# Patient Record
Sex: FEMALE | Race: WHITE | NOT HISPANIC OR LATINO | Employment: FULL TIME | URBAN - METROPOLITAN AREA
[De-identification: names, ages, dates, MRNs, and addresses within clinical notes are randomized per-mention and may not be internally consistent; named-entity substitution may affect disease eponyms.]

---

## 2019-01-23 ENCOUNTER — NON-PROVIDER VISIT (OUTPATIENT)
Dept: URGENT CARE | Facility: CLINIC | Age: 24
End: 2019-01-23

## 2019-01-23 ENCOUNTER — HOSPITAL ENCOUNTER (OUTPATIENT)
Facility: MEDICAL CENTER | Age: 24
End: 2019-01-23
Attending: PHYSICIAN ASSISTANT
Payer: COMMERCIAL

## 2019-01-23 DIAGNOSIS — Z77.011 LEAD EXPOSURE: ICD-10-CM

## 2019-01-23 PROCEDURE — 84202 ASSAY RBC PROTOPORPHYRIN: CPT | Performed by: PHYSICIAN ASSISTANT

## 2019-01-23 PROCEDURE — 83655 ASSAY OF LEAD: CPT | Performed by: PHYSICIAN ASSISTANT

## 2019-01-28 LAB
LEAD BLD-MCNC: <2 UG/DL (ref 0–4.9)
ZPP RBC-MCNC: 23 UG/DL (ref 0–40)
ZPP RBC-SRTO: 39 UMOLZPP/MOLHEM (ref 0–69)

## 2019-02-01 ENCOUNTER — HOSPITAL ENCOUNTER (OUTPATIENT)
Facility: MEDICAL CENTER | Age: 24
End: 2019-02-01
Attending: FAMILY MEDICINE
Payer: COMMERCIAL

## 2019-02-01 ENCOUNTER — OFFICE VISIT (OUTPATIENT)
Dept: URGENT CARE | Facility: CLINIC | Age: 24
End: 2019-02-01

## 2019-02-01 VITALS
HEIGHT: 66 IN | OXYGEN SATURATION: 98 % | RESPIRATION RATE: 14 BRPM | BODY MASS INDEX: 23.46 KG/M2 | WEIGHT: 146 LBS | TEMPERATURE: 98.9 F | HEART RATE: 72 BPM | SYSTOLIC BLOOD PRESSURE: 116 MMHG | DIASTOLIC BLOOD PRESSURE: 72 MMHG

## 2019-02-01 DIAGNOSIS — Z02.89 ENCOUNTER FOR OCCUPATIONAL HEALTH ASSESSMENT: ICD-10-CM

## 2019-02-01 DIAGNOSIS — Z01.89 RESPIRATORY CLEARANCE EXAMINATION, ENCOUNTER FOR: ICD-10-CM

## 2019-02-01 LAB
ALBUMIN SERPL BCP-MCNC: 4.9 G/DL (ref 3.2–4.9)
ALBUMIN/GLOB SERPL: 2.1 G/DL
ALP SERPL-CCNC: 46 U/L (ref 30–99)
ALT SERPL-CCNC: 17 U/L (ref 2–50)
AMORPH CRY #/AREA URNS HPF: PRESENT /HPF
ANION GAP SERPL CALC-SCNC: 12 MMOL/L (ref 0–11.9)
APPEARANCE UR: CLEAR
AST SERPL-CCNC: 19 U/L (ref 12–45)
BACTERIA #/AREA URNS HPF: ABNORMAL /HPF
BASOPHILS # BLD AUTO: 0.5 % (ref 0–1.8)
BASOPHILS # BLD: 0.05 K/UL (ref 0–0.12)
BILIRUB SERPL-MCNC: 0.6 MG/DL (ref 0.1–1.5)
BILIRUB UR QL STRIP.AUTO: NEGATIVE
BUN SERPL-MCNC: 12 MG/DL (ref 8–22)
CALCIUM SERPL-MCNC: 10.4 MG/DL (ref 8.5–10.5)
CHLORIDE SERPL-SCNC: 106 MMOL/L (ref 96–112)
CO2 SERPL-SCNC: 22 MMOL/L (ref 20–33)
COLOR UR: YELLOW
CREAT SERPL-MCNC: 0.85 MG/DL (ref 0.5–1.4)
EOSINOPHIL # BLD AUTO: 0.09 K/UL (ref 0–0.51)
EOSINOPHIL NFR BLD: 0.9 % (ref 0–6.9)
EPI CELLS #/AREA URNS HPF: ABNORMAL /HPF
ERYTHROCYTE [DISTWIDTH] IN BLOOD BY AUTOMATED COUNT: 43.8 FL (ref 35.9–50)
GLOBULIN SER CALC-MCNC: 2.3 G/DL (ref 1.9–3.5)
GLUCOSE SERPL-MCNC: 96 MG/DL (ref 65–99)
GLUCOSE UR STRIP.AUTO-MCNC: NEGATIVE MG/DL
HCT VFR BLD AUTO: 46.4 % (ref 37–47)
HGB BLD-MCNC: 15.3 G/DL (ref 12–16)
IMM GRANULOCYTES # BLD AUTO: 0.02 K/UL (ref 0–0.11)
IMM GRANULOCYTES NFR BLD AUTO: 0.2 % (ref 0–0.9)
KETONES UR STRIP.AUTO-MCNC: NEGATIVE MG/DL
LEUKOCYTE ESTERASE UR QL STRIP.AUTO: NEGATIVE
LYMPHOCYTES # BLD AUTO: 2.94 K/UL (ref 1–4.8)
LYMPHOCYTES NFR BLD: 27.8 % (ref 22–41)
MCH RBC QN AUTO: 30.4 PG (ref 27–33)
MCHC RBC AUTO-ENTMCNC: 33 G/DL (ref 33.6–35)
MCV RBC AUTO: 92.1 FL (ref 81.4–97.8)
MONOCYTES # BLD AUTO: 0.73 K/UL (ref 0–0.85)
MONOCYTES NFR BLD AUTO: 6.9 % (ref 0–13.4)
NEUTROPHILS # BLD AUTO: 6.75 K/UL (ref 2–7.15)
NEUTROPHILS NFR BLD: 63.7 % (ref 44–72)
NITRITE UR QL STRIP.AUTO: NEGATIVE
NRBC # BLD AUTO: 0 K/UL
NRBC BLD-RTO: 0 /100 WBC
PH UR STRIP.AUTO: 6 [PH]
PLATELET # BLD AUTO: 338 K/UL (ref 164–446)
PMV BLD AUTO: 10.4 FL (ref 9–12.9)
POTASSIUM SERPL-SCNC: 4.7 MMOL/L (ref 3.6–5.5)
PROT SERPL-MCNC: 7.2 G/DL (ref 6–8.2)
PROT UR QL STRIP: 30 MG/DL
RBC # BLD AUTO: 5.04 M/UL (ref 4.2–5.4)
RBC UR QL AUTO: NEGATIVE
SODIUM SERPL-SCNC: 140 MMOL/L (ref 135–145)
SP GR UR STRIP.AUTO: 1.02
UROBILINOGEN UR STRIP.AUTO-MCNC: 0.2 MG/DL
WBC # BLD AUTO: 10.6 K/UL (ref 4.8–10.8)

## 2019-02-01 PROCEDURE — 8915 PR COMPREHENSIVE PHYSICAL: Performed by: FAMILY MEDICINE

## 2019-02-01 PROCEDURE — 94010 BREATHING CAPACITY TEST: CPT | Performed by: FAMILY MEDICINE

## 2019-02-01 NOTE — PROGRESS NOTES
"Here for pre-employment physical, spirometry         spirometry - within normal limits.    Spirometry showed no sx of restrictive or obstructive lung disease      Physical exam - unremarkable.       Plan:      Cleared for duty without restriction or accomodation.      See physical exam form scanned under \"media\"    "

## 2019-02-20 ENCOUNTER — HOSPITAL ENCOUNTER (OUTPATIENT)
Facility: MEDICAL CENTER | Age: 24
End: 2019-02-20
Attending: NURSE PRACTITIONER
Payer: COMMERCIAL

## 2019-02-20 ENCOUNTER — NON-PROVIDER VISIT (OUTPATIENT)
Dept: URGENT CARE | Facility: CLINIC | Age: 24
End: 2019-02-20

## 2019-02-20 DIAGNOSIS — Z77.011 LEAD EXPOSURE: ICD-10-CM

## 2019-02-20 PROCEDURE — 83655 ASSAY OF LEAD: CPT | Performed by: NURSE PRACTITIONER

## 2019-02-20 PROCEDURE — 84202 ASSAY RBC PROTOPORPHYRIN: CPT | Performed by: NURSE PRACTITIONER

## 2019-02-25 LAB
LEAD BLD-MCNC: <2 UG/DL (ref 0–4.9)
ZPP RBC-MCNC: 23 UG/DL (ref 0–40)
ZPP RBC-SRTO: 40 UMOLZPP/MOLHEM (ref 0–69)

## 2019-03-20 ENCOUNTER — NON-PROVIDER VISIT (OUTPATIENT)
Dept: URGENT CARE | Facility: CLINIC | Age: 24
End: 2019-03-20

## 2019-03-20 ENCOUNTER — HOSPITAL ENCOUNTER (OUTPATIENT)
Facility: MEDICAL CENTER | Age: 24
End: 2019-03-20
Attending: PHYSICIAN ASSISTANT
Payer: COMMERCIAL

## 2019-03-20 DIAGNOSIS — Z77.011 LEAD EXPOSURE: ICD-10-CM

## 2019-03-20 PROCEDURE — 84202 ASSAY RBC PROTOPORPHYRIN: CPT | Performed by: PHYSICIAN ASSISTANT

## 2019-03-20 PROCEDURE — 83655 ASSAY OF LEAD: CPT | Performed by: PHYSICIAN ASSISTANT

## 2019-03-21 DIAGNOSIS — Z77.011 LEAD EXPOSURE: ICD-10-CM

## 2019-03-25 LAB
LEAD BLD-MCNC: <2 UG/DL (ref 0–4.9)
ZPP RBC-MCNC: 21 UG/DL (ref 0–40)
ZPP RBC-SRTO: 36 UMOLZPP/MOLHEM (ref 0–69)

## 2019-04-09 ENCOUNTER — HOSPITAL ENCOUNTER (OUTPATIENT)
Facility: MEDICAL CENTER | Age: 24
End: 2019-04-09
Attending: NURSE PRACTITIONER
Payer: COMMERCIAL

## 2019-04-09 ENCOUNTER — NON-PROVIDER VISIT (OUTPATIENT)
Dept: URGENT CARE | Facility: CLINIC | Age: 24
End: 2019-04-09

## 2019-04-09 DIAGNOSIS — Z77.011 LEAD EXPOSURE: ICD-10-CM

## 2019-04-09 PROCEDURE — 83655 ASSAY OF LEAD: CPT | Performed by: PHYSICIAN ASSISTANT

## 2019-04-09 PROCEDURE — 84202 ASSAY RBC PROTOPORPHYRIN: CPT | Performed by: PHYSICIAN ASSISTANT

## 2019-04-10 DIAGNOSIS — Z77.011 LEAD EXPOSURE: ICD-10-CM

## 2019-04-15 LAB
LEAD BLD-MCNC: <2 UG/DL (ref 0–4.9)
ZPP RBC-MCNC: 25 UG/DL (ref 0–40)
ZPP RBC-SRTO: 43 UMOLZPP/MOLHEM (ref 0–69)

## 2019-05-23 ENCOUNTER — NON-PROVIDER VISIT (OUTPATIENT)
Dept: URGENT CARE | Facility: CLINIC | Age: 24
End: 2019-05-23

## 2019-05-23 DIAGNOSIS — Z77.011 LEAD EXPOSURE: ICD-10-CM

## 2019-05-23 PROCEDURE — 84202 ASSAY RBC PROTOPORPHYRIN: CPT | Performed by: PHYSICIAN ASSISTANT

## 2019-05-23 PROCEDURE — 83655 ASSAY OF LEAD: CPT | Performed by: PHYSICIAN ASSISTANT

## 2019-05-24 ENCOUNTER — HOSPITAL ENCOUNTER (OUTPATIENT)
Facility: MEDICAL CENTER | Age: 24
End: 2019-05-24
Attending: PHYSICIAN ASSISTANT
Payer: COMMERCIAL

## 2019-05-25 DIAGNOSIS — Z77.011 LEAD EXPOSURE: ICD-10-CM

## 2019-05-29 LAB
LEAD BLD-MCNC: <2 UG/DL (ref 0–4.9)
ZPP RBC-MCNC: 25 UG/DL (ref 0–40)
ZPP RBC-SRTO: 42 UMOLZPP/MOLHEM (ref 0–69)

## 2019-06-20 ENCOUNTER — NON-PROVIDER VISIT (OUTPATIENT)
Dept: URGENT CARE | Facility: CLINIC | Age: 24
End: 2019-06-20

## 2019-06-20 DIAGNOSIS — Z77.011 LEAD EXPOSURE: ICD-10-CM

## 2019-06-20 PROCEDURE — 83655 ASSAY OF LEAD: CPT | Performed by: PHYSICIAN ASSISTANT

## 2019-06-20 PROCEDURE — 84202 ASSAY RBC PROTOPORPHYRIN: CPT | Performed by: PHYSICIAN ASSISTANT

## 2019-06-21 ENCOUNTER — HOSPITAL ENCOUNTER (OUTPATIENT)
Facility: MEDICAL CENTER | Age: 24
End: 2019-06-21
Attending: PHYSICIAN ASSISTANT
Payer: COMMERCIAL

## 2019-06-21 DIAGNOSIS — Z77.011 LEAD EXPOSURE: ICD-10-CM

## 2019-06-25 LAB
LEAD BLD-MCNC: <2 UG/DL (ref 0–4.9)
ZPP RBC-MCNC: 24 UG/DL (ref 0–40)
ZPP RBC-SRTO: 41 UMOLZPP/MOLHEM (ref 0–69)

## 2021-02-05 ENCOUNTER — HOSPITAL ENCOUNTER (INPATIENT)
Facility: MEDICAL CENTER | Age: 26
LOS: 1 days | DRG: 894 | End: 2021-02-06
Attending: EMERGENCY MEDICINE | Admitting: STUDENT IN AN ORGANIZED HEALTH CARE EDUCATION/TRAINING PROGRAM
Payer: COMMERCIAL

## 2021-02-05 DIAGNOSIS — F10.930 ALCOHOL WITHDRAWAL SYNDROME WITHOUT COMPLICATION (HCC): ICD-10-CM

## 2021-02-05 DIAGNOSIS — E87.29 ALCOHOLIC KETOACIDOSIS: ICD-10-CM

## 2021-02-05 DIAGNOSIS — E86.0 DEHYDRATION: ICD-10-CM

## 2021-02-05 PROBLEM — R56.9 SEIZURE (HCC): Status: ACTIVE | Noted: 2021-02-05

## 2021-02-05 PROBLEM — R74.01 TRANSAMINITIS: Status: ACTIVE | Noted: 2021-02-05

## 2021-02-05 PROBLEM — F99 PSYCHIATRIC DISORDER: Status: ACTIVE | Noted: 2021-02-05

## 2021-02-05 PROBLEM — F32.A DEPRESSION: Status: ACTIVE | Noted: 2021-02-05

## 2021-02-05 PROBLEM — R73.9 HYPERGLYCEMIA: Status: ACTIVE | Noted: 2021-02-05

## 2021-02-05 PROBLEM — E87.6 HYPOKALEMIA: Status: ACTIVE | Noted: 2021-02-05

## 2021-02-05 PROBLEM — F32.A DEPRESSION: Status: RESOLVED | Noted: 2021-02-05 | Resolved: 2021-02-05

## 2021-02-05 PROBLEM — J45.20 MILD INTERMITTENT ASTHMA WITHOUT COMPLICATION: Status: ACTIVE | Noted: 2021-02-05

## 2021-02-05 LAB
ALBUMIN SERPL BCP-MCNC: 4.5 G/DL (ref 3.2–4.9)
ALBUMIN/GLOB SERPL: 1.5 G/DL
ALP SERPL-CCNC: 76 U/L (ref 30–99)
ALT SERPL-CCNC: 68 U/L (ref 2–50)
AMORPH CRY #/AREA URNS HPF: PRESENT /HPF
ANION GAP SERPL CALC-SCNC: 24 MMOL/L (ref 7–16)
APPEARANCE UR: CLEAR
AST SERPL-CCNC: 160 U/L (ref 12–45)
BACTERIA #/AREA URNS HPF: ABNORMAL /HPF
BASOPHILS # BLD AUTO: 0.1 % (ref 0–1.8)
BASOPHILS # BLD: 0.01 K/UL (ref 0–0.12)
BILIRUB SERPL-MCNC: 1.6 MG/DL (ref 0.1–1.5)
BUN SERPL-MCNC: 12 MG/DL (ref 8–22)
CALCIUM SERPL-MCNC: 10.3 MG/DL (ref 8.4–10.2)
CHLORIDE SERPL-SCNC: 91 MMOL/L (ref 96–112)
CO2 SERPL-SCNC: 20 MMOL/L (ref 20–33)
COLOR UR: ABNORMAL
CREAT SERPL-MCNC: 0.99 MG/DL (ref 0.5–1.4)
EKG IMPRESSION: NORMAL
EOSINOPHIL # BLD AUTO: 0.01 K/UL (ref 0–0.51)
EOSINOPHIL NFR BLD: 0.1 % (ref 0–6.9)
EPI CELLS #/AREA URNS HPF: ABNORMAL /HPF
ERYTHROCYTE [DISTWIDTH] IN BLOOD BY AUTOMATED COUNT: 43.8 FL (ref 35.9–50)
ETHANOL BLD-MCNC: <10.1 MG/DL (ref 0–10)
GLOBULIN SER CALC-MCNC: 3 G/DL (ref 1.9–3.5)
GLUCOSE SERPL-MCNC: 204 MG/DL (ref 65–99)
HCG SERPL QL: NEGATIVE
HCT VFR BLD AUTO: 42.4 % (ref 37–47)
HGB BLD-MCNC: 14.7 G/DL (ref 12–16)
IMM GRANULOCYTES # BLD AUTO: 0.03 K/UL (ref 0–0.11)
IMM GRANULOCYTES NFR BLD AUTO: 0.4 % (ref 0–0.9)
LIPASE SERPL-CCNC: 47 U/L (ref 7–58)
LYMPHOCYTES # BLD AUTO: 0.69 K/UL (ref 1–4.8)
LYMPHOCYTES NFR BLD: 9 % (ref 22–41)
MCH RBC QN AUTO: 33.5 PG (ref 27–33)
MCHC RBC AUTO-ENTMCNC: 34.7 G/DL (ref 33.6–35)
MCV RBC AUTO: 96.6 FL (ref 81.4–97.8)
MICRO URNS: ABNORMAL
MONOCYTES # BLD AUTO: 0.45 K/UL (ref 0–0.85)
MONOCYTES NFR BLD AUTO: 5.8 % (ref 0–13.4)
MUCOUS THREADS #/AREA URNS HPF: ABNORMAL /HPF
NEUTROPHILS # BLD AUTO: 6.51 K/UL (ref 2–7.15)
NEUTROPHILS NFR BLD: 84.6 % (ref 44–72)
NRBC # BLD AUTO: 0 K/UL
NRBC BLD-RTO: 0 /100 WBC
PH UR STRIP.AUTO: ABNORMAL [PH] (ref 5–8)
PLATELET # BLD AUTO: 151 K/UL (ref 164–446)
PMV BLD AUTO: 10.8 FL (ref 9–12.9)
POTASSIUM SERPL-SCNC: 3.3 MMOL/L (ref 3.6–5.5)
PROT SERPL-MCNC: 7.5 G/DL (ref 6–8.2)
RBC # BLD AUTO: 4.39 M/UL (ref 4.2–5.4)
RBC # URNS HPF: ABNORMAL /HPF
SODIUM SERPL-SCNC: 135 MMOL/L (ref 135–145)
SP GR UR STRIP.AUTO: 1.02
WBC # BLD AUTO: 7.7 K/UL (ref 4.8–10.8)
WBC #/AREA URNS HPF: ABNORMAL /HPF

## 2021-02-05 PROCEDURE — 700102 HCHG RX REV CODE 250 W/ 637 OVERRIDE(OP): Performed by: STUDENT IN AN ORGANIZED HEALTH CARE EDUCATION/TRAINING PROGRAM

## 2021-02-05 PROCEDURE — 96374 THER/PROPH/DIAG INJ IV PUSH: CPT

## 2021-02-05 PROCEDURE — 81001 URINALYSIS AUTO W/SCOPE: CPT

## 2021-02-05 PROCEDURE — 700105 HCHG RX REV CODE 258: Performed by: EMERGENCY MEDICINE

## 2021-02-05 PROCEDURE — 83690 ASSAY OF LIPASE: CPT

## 2021-02-05 PROCEDURE — 700111 HCHG RX REV CODE 636 W/ 250 OVERRIDE (IP)

## 2021-02-05 PROCEDURE — 84703 CHORIONIC GONADOTROPIN ASSAY: CPT

## 2021-02-05 PROCEDURE — A9270 NON-COVERED ITEM OR SERVICE: HCPCS | Performed by: STUDENT IN AN ORGANIZED HEALTH CARE EDUCATION/TRAINING PROGRAM

## 2021-02-05 PROCEDURE — 99285 EMERGENCY DEPT VISIT HI MDM: CPT

## 2021-02-05 PROCEDURE — 93005 ELECTROCARDIOGRAM TRACING: CPT | Performed by: EMERGENCY MEDICINE

## 2021-02-05 PROCEDURE — U0003 INFECTIOUS AGENT DETECTION BY NUCLEIC ACID (DNA OR RNA); SEVERE ACUTE RESPIRATORY SYNDROME CORONAVIRUS 2 (SARS-COV-2) (CORONAVIRUS DISEASE [COVID-19]), AMPLIFIED PROBE TECHNIQUE, MAKING USE OF HIGH THROUGHPUT TECHNOLOGIES AS DESCRIBED BY CMS-2020-01-R: HCPCS

## 2021-02-05 PROCEDURE — 82077 ASSAY SPEC XCP UR&BREATH IA: CPT

## 2021-02-05 PROCEDURE — HZ2ZZZZ DETOXIFICATION SERVICES FOR SUBSTANCE ABUSE TREATMENT: ICD-10-PCS | Performed by: STUDENT IN AN ORGANIZED HEALTH CARE EDUCATION/TRAINING PROGRAM

## 2021-02-05 PROCEDURE — 93005 ELECTROCARDIOGRAM TRACING: CPT

## 2021-02-05 PROCEDURE — 700111 HCHG RX REV CODE 636 W/ 250 OVERRIDE (IP): Performed by: STUDENT IN AN ORGANIZED HEALTH CARE EDUCATION/TRAINING PROGRAM

## 2021-02-05 PROCEDURE — 85025 COMPLETE CBC W/AUTO DIFF WBC: CPT

## 2021-02-05 PROCEDURE — 700105 HCHG RX REV CODE 258: Performed by: STUDENT IN AN ORGANIZED HEALTH CARE EDUCATION/TRAINING PROGRAM

## 2021-02-05 PROCEDURE — 770020 HCHG ROOM/CARE - TELE (206)

## 2021-02-05 PROCEDURE — 99223 1ST HOSP IP/OBS HIGH 75: CPT | Performed by: STUDENT IN AN ORGANIZED HEALTH CARE EDUCATION/TRAINING PROGRAM

## 2021-02-05 PROCEDURE — 83036 HEMOGLOBIN GLYCOSYLATED A1C: CPT

## 2021-02-05 PROCEDURE — 80053 COMPREHEN METABOLIC PANEL: CPT

## 2021-02-05 PROCEDURE — 96375 TX/PRO/DX INJ NEW DRUG ADDON: CPT

## 2021-02-05 PROCEDURE — 700111 HCHG RX REV CODE 636 W/ 250 OVERRIDE (IP): Performed by: EMERGENCY MEDICINE

## 2021-02-05 PROCEDURE — U0005 INFEC AGEN DETEC AMPLI PROBE: HCPCS

## 2021-02-05 RX ORDER — PROMETHAZINE HYDROCHLORIDE 25 MG/1
12.5-25 TABLET ORAL EVERY 4 HOURS PRN
Status: DISCONTINUED | OUTPATIENT
Start: 2021-02-05 | End: 2021-02-06 | Stop reason: HOSPADM

## 2021-02-05 RX ORDER — SODIUM CHLORIDE, SODIUM LACTATE, POTASSIUM CHLORIDE, CALCIUM CHLORIDE 600; 310; 30; 20 MG/100ML; MG/100ML; MG/100ML; MG/100ML
1000 INJECTION, SOLUTION INTRAVENOUS ONCE
Status: DISCONTINUED | OUTPATIENT
Start: 2021-02-05 | End: 2021-02-05

## 2021-02-05 RX ORDER — LORAZEPAM 0.5 MG/1
0.5 TABLET ORAL EVERY 4 HOURS PRN
Status: DISCONTINUED | OUTPATIENT
Start: 2021-02-05 | End: 2021-02-06 | Stop reason: HOSPADM

## 2021-02-05 RX ORDER — SODIUM CHLORIDE, SODIUM LACTATE, POTASSIUM CHLORIDE, CALCIUM CHLORIDE 600; 310; 30; 20 MG/100ML; MG/100ML; MG/100ML; MG/100ML
1000 INJECTION, SOLUTION INTRAVENOUS ONCE
Status: COMPLETED | OUTPATIENT
Start: 2021-02-05 | End: 2021-02-05

## 2021-02-05 RX ORDER — AMOXICILLIN 250 MG
2 CAPSULE ORAL 2 TIMES DAILY
Status: DISCONTINUED | OUTPATIENT
Start: 2021-02-06 | End: 2021-02-06 | Stop reason: HOSPADM

## 2021-02-05 RX ORDER — LORAZEPAM 2 MG/ML
2 INJECTION INTRAMUSCULAR
Status: DISCONTINUED | OUTPATIENT
Start: 2021-02-05 | End: 2021-02-06 | Stop reason: HOSPADM

## 2021-02-05 RX ORDER — LORAZEPAM 2 MG/ML
1 INJECTION INTRAMUSCULAR
Status: DISCONTINUED | OUTPATIENT
Start: 2021-02-05 | End: 2021-02-06 | Stop reason: HOSPADM

## 2021-02-05 RX ORDER — LORAZEPAM 2 MG/ML
1.5 INJECTION INTRAMUSCULAR
Status: DISCONTINUED | OUTPATIENT
Start: 2021-02-05 | End: 2021-02-06 | Stop reason: HOSPADM

## 2021-02-05 RX ORDER — PROCHLORPERAZINE EDISYLATE 5 MG/ML
5-10 INJECTION INTRAMUSCULAR; INTRAVENOUS EVERY 4 HOURS PRN
Status: DISCONTINUED | OUTPATIENT
Start: 2021-02-05 | End: 2021-02-06 | Stop reason: HOSPADM

## 2021-02-05 RX ORDER — CLONIDINE HYDROCHLORIDE 0.1 MG/1
0.1 TABLET ORAL EVERY 6 HOURS PRN
Status: DISCONTINUED | OUTPATIENT
Start: 2021-02-05 | End: 2021-02-06 | Stop reason: HOSPADM

## 2021-02-05 RX ORDER — BISACODYL 10 MG
10 SUPPOSITORY, RECTAL RECTAL
Status: DISCONTINUED | OUTPATIENT
Start: 2021-02-05 | End: 2021-02-06 | Stop reason: HOSPADM

## 2021-02-05 RX ORDER — ONDANSETRON 2 MG/ML
4 INJECTION INTRAMUSCULAR; INTRAVENOUS EVERY 4 HOURS PRN
Status: DISCONTINUED | OUTPATIENT
Start: 2021-02-05 | End: 2021-02-05

## 2021-02-05 RX ORDER — SODIUM CHLORIDE 9 MG/ML
1000 INJECTION, SOLUTION INTRAVENOUS ONCE
Status: COMPLETED | OUTPATIENT
Start: 2021-02-05 | End: 2021-02-05

## 2021-02-05 RX ORDER — NAPROXEN 500 MG/1
500 TABLET ORAL 2 TIMES DAILY WITH MEALS
Qty: 30 TAB | Refills: 0 | Status: SHIPPED | OUTPATIENT
Start: 2021-02-05 | End: 2022-11-29

## 2021-02-05 RX ORDER — GAUZE BANDAGE 2" X 2"
100 BANDAGE TOPICAL DAILY
Status: DISCONTINUED | OUTPATIENT
Start: 2021-02-05 | End: 2021-02-06 | Stop reason: HOSPADM

## 2021-02-05 RX ORDER — LORAZEPAM 1 MG/1
1 TABLET ORAL EVERY 4 HOURS PRN
Qty: 30 TAB | Refills: 0 | Status: SHIPPED | OUTPATIENT
Start: 2021-02-05 | End: 2021-02-10

## 2021-02-05 RX ORDER — POLYETHYLENE GLYCOL 3350 17 G/17G
1 POWDER, FOR SOLUTION ORAL
Status: DISCONTINUED | OUTPATIENT
Start: 2021-02-05 | End: 2021-02-06 | Stop reason: HOSPADM

## 2021-02-05 RX ORDER — KETOROLAC TROMETHAMINE 30 MG/ML
INJECTION, SOLUTION INTRAMUSCULAR; INTRAVENOUS
Status: COMPLETED
Start: 2021-02-05 | End: 2021-02-05

## 2021-02-05 RX ORDER — POTASSIUM CHLORIDE 20 MEQ/1
40 TABLET, EXTENDED RELEASE ORAL ONCE
Status: COMPLETED | OUTPATIENT
Start: 2021-02-05 | End: 2021-02-05

## 2021-02-05 RX ORDER — ONDANSETRON 4 MG/1
4 TABLET, ORALLY DISINTEGRATING ORAL EVERY 6 HOURS PRN
Qty: 10 TAB | Refills: 0 | Status: SHIPPED | OUTPATIENT
Start: 2021-02-05 | End: 2022-11-29

## 2021-02-05 RX ORDER — MAGNESIUM SULFATE HEPTAHYDRATE 40 MG/ML
2 INJECTION, SOLUTION INTRAVENOUS ONCE
Status: COMPLETED | OUTPATIENT
Start: 2021-02-05 | End: 2021-02-05

## 2021-02-05 RX ORDER — LABETALOL HYDROCHLORIDE 5 MG/ML
10 INJECTION, SOLUTION INTRAVENOUS EVERY 4 HOURS PRN
Status: DISCONTINUED | OUTPATIENT
Start: 2021-02-05 | End: 2021-02-06 | Stop reason: HOSPADM

## 2021-02-05 RX ORDER — LORAZEPAM 1 MG/1
3 TABLET ORAL
Status: DISCONTINUED | OUTPATIENT
Start: 2021-02-05 | End: 2021-02-06 | Stop reason: HOSPADM

## 2021-02-05 RX ORDER — LORAZEPAM 1 MG/1
1 TABLET ORAL EVERY 4 HOURS PRN
Qty: 30 TAB | Refills: 0 | Status: SHIPPED | OUTPATIENT
Start: 2021-02-05 | End: 2021-02-05 | Stop reason: SDUPTHER

## 2021-02-05 RX ORDER — PROMETHAZINE HYDROCHLORIDE 25 MG/1
12.5-25 SUPPOSITORY RECTAL EVERY 4 HOURS PRN
Status: DISCONTINUED | OUTPATIENT
Start: 2021-02-05 | End: 2021-02-06 | Stop reason: HOSPADM

## 2021-02-05 RX ORDER — LORAZEPAM 2 MG/ML
0.5 INJECTION INTRAMUSCULAR EVERY 4 HOURS PRN
Status: DISCONTINUED | OUTPATIENT
Start: 2021-02-05 | End: 2021-02-06 | Stop reason: HOSPADM

## 2021-02-05 RX ORDER — KETOROLAC TROMETHAMINE 30 MG/ML
30 INJECTION, SOLUTION INTRAMUSCULAR; INTRAVENOUS ONCE
Status: COMPLETED | OUTPATIENT
Start: 2021-02-05 | End: 2021-02-05

## 2021-02-05 RX ORDER — SERTRALINE HYDROCHLORIDE 25 MG/1
12.5 TABLET, FILM COATED ORAL EVERY MORNING
COMMUNITY
End: 2022-11-29

## 2021-02-05 RX ORDER — FOLIC ACID 1 MG/1
1 TABLET ORAL DAILY
Status: DISCONTINUED | OUTPATIENT
Start: 2021-02-05 | End: 2021-02-06 | Stop reason: HOSPADM

## 2021-02-05 RX ORDER — LORAZEPAM 1 MG/1
1 TABLET ORAL EVERY 4 HOURS PRN
Status: DISCONTINUED | OUTPATIENT
Start: 2021-02-05 | End: 2021-02-06 | Stop reason: HOSPADM

## 2021-02-05 RX ORDER — ACETAMINOPHEN 325 MG/1
650 TABLET ORAL EVERY 6 HOURS PRN
Status: DISCONTINUED | OUTPATIENT
Start: 2021-02-05 | End: 2021-02-06 | Stop reason: HOSPADM

## 2021-02-05 RX ORDER — LORAZEPAM 2 MG/ML
2 INJECTION INTRAMUSCULAR ONCE
Status: COMPLETED | OUTPATIENT
Start: 2021-02-05 | End: 2021-02-05

## 2021-02-05 RX ORDER — ONDANSETRON 4 MG/1
4 TABLET, ORALLY DISINTEGRATING ORAL EVERY 4 HOURS PRN
Status: DISCONTINUED | OUTPATIENT
Start: 2021-02-05 | End: 2021-02-05

## 2021-02-05 RX ORDER — LORAZEPAM 1 MG/1
2 TABLET ORAL
Status: DISCONTINUED | OUTPATIENT
Start: 2021-02-05 | End: 2021-02-06 | Stop reason: HOSPADM

## 2021-02-05 RX ORDER — LORAZEPAM 1 MG/1
4 TABLET ORAL
Status: DISCONTINUED | OUTPATIENT
Start: 2021-02-05 | End: 2021-02-06 | Stop reason: HOSPADM

## 2021-02-05 RX ORDER — SODIUM CHLORIDE, SODIUM LACTATE, POTASSIUM CHLORIDE, CALCIUM CHLORIDE 600; 310; 30; 20 MG/100ML; MG/100ML; MG/100ML; MG/100ML
INJECTION, SOLUTION INTRAVENOUS CONTINUOUS
Status: DISCONTINUED | OUTPATIENT
Start: 2021-02-05 | End: 2021-02-06

## 2021-02-05 RX ADMIN — SODIUM CHLORIDE, POTASSIUM CHLORIDE, SODIUM LACTATE AND CALCIUM CHLORIDE: 600; 310; 30; 20 INJECTION, SOLUTION INTRAVENOUS at 16:52

## 2021-02-05 RX ADMIN — LORAZEPAM 1 MG: 1 TABLET ORAL at 16:56

## 2021-02-05 RX ADMIN — LORAZEPAM 2 MG: 2 INJECTION INTRAMUSCULAR; INTRAVENOUS at 11:11

## 2021-02-05 RX ADMIN — LORAZEPAM 1 MG: 1 TABLET ORAL at 20:45

## 2021-02-05 RX ADMIN — THERA TABS 1 TABLET: TAB at 16:57

## 2021-02-05 RX ADMIN — FOLIC ACID 1 MG: 1 TABLET ORAL at 16:56

## 2021-02-05 RX ADMIN — POTASSIUM CHLORIDE 40 MEQ: 1500 TABLET, EXTENDED RELEASE ORAL at 16:56

## 2021-02-05 RX ADMIN — MAGNESIUM SULFATE 2 G: 2 INJECTION INTRAVENOUS at 16:52

## 2021-02-05 RX ADMIN — PROCHLORPERAZINE EDISYLATE 5 MG: 5 INJECTION INTRAMUSCULAR; INTRAVENOUS at 16:56

## 2021-02-05 RX ADMIN — THIAMINE HCL TAB 100 MG 100 MG: 100 TAB at 16:52

## 2021-02-05 RX ADMIN — ENOXAPARIN SODIUM 40 MG: 40 INJECTION SUBCUTANEOUS at 16:57

## 2021-02-05 RX ADMIN — SODIUM CHLORIDE 1000 ML: 9 INJECTION, SOLUTION INTRAVENOUS at 11:11

## 2021-02-05 RX ADMIN — KETOROLAC TROMETHAMINE 30 MG: 30 INJECTION, SOLUTION INTRAMUSCULAR at 12:40

## 2021-02-05 RX ADMIN — SODIUM CHLORIDE, POTASSIUM CHLORIDE, SODIUM LACTATE AND CALCIUM CHLORIDE 1000 ML: 600; 310; 30; 20 INJECTION, SOLUTION INTRAVENOUS at 14:04

## 2021-02-05 ASSESSMENT — COGNITIVE AND FUNCTIONAL STATUS - GENERAL
CLIMB 3 TO 5 STEPS WITH RAILING: A LOT
MOBILITY SCORE: 18
SUGGESTED CMS G CODE MODIFIER MOBILITY: CK
MOVING TO AND FROM BED TO CHAIR: A LITTLE
STANDING UP FROM CHAIR USING ARMS: A LITTLE
DRESSING REGULAR LOWER BODY CLOTHING: A LITTLE
SUGGESTED CMS G CODE MODIFIER DAILY ACTIVITY: CJ
WALKING IN HOSPITAL ROOM: A LITTLE
DAILY ACTIVITIY SCORE: 21
MOVING FROM LYING ON BACK TO SITTING ON SIDE OF FLAT BED: A LITTLE
HELP NEEDED FOR BATHING: A LITTLE
TOILETING: A LITTLE

## 2021-02-05 ASSESSMENT — LIFESTYLE VARIABLES
PAROXYSMAL SWEATS: *
CONSUMPTION TOTAL: POSITIVE
TOTAL SCORE: 10
VISUAL DISTURBANCES: NOT PRESENT
TOTAL SCORE: 3
AUDITORY DISTURBANCES: NOT PRESENT
NAUSEA AND VOMITING: MILD NAUSEA WITH NO VOMITING
AVERAGE NUMBER OF DAYS PER WEEK YOU HAVE A DRINK CONTAINING ALCOHOL: 7
HEADACHE, FULLNESS IN HEAD: VERY MILD
TOTAL SCORE: 10
HOW MANY TIMES IN THE PAST YEAR HAVE YOU HAD 5 OR MORE DRINKS IN A DAY: 35
HAVE YOU EVER FELT YOU SHOULD CUT DOWN ON YOUR DRINKING: YES
TREMOR: MODERATE TREMOR WITH ARMS EXTENDED
AGITATION: NORMAL ACTIVITY
HEADACHE, FULLNESS IN HEAD: MILD
PAROXYSMAL SWEATS: *
TREMOR: MODERATE TREMOR WITH ARMS EXTENDED
SUBSTANCE_ABUSE: 1
ANXIETY: MILDLY ANXIOUS
TOTAL SCORE: 3
ORIENTATION AND CLOUDING OF SENSORIUM: ORIENTED AND CAN DO SERIAL ADDITIONS
ALCOHOL_USE: YES
ON A TYPICAL DAY WHEN YOU DRINK ALCOHOL HOW MANY DRINKS DO YOU HAVE: 6
VISUAL DISTURBANCES: NOT PRESENT
AUDITORY DISTURBANCES: NOT PRESENT
TOTAL SCORE: 3
ANXIETY: *
AGITATION: NORMAL ACTIVITY
HAVE PEOPLE ANNOYED YOU BY CRITICIZING YOUR DRINKING: NO
ORIENTATION AND CLOUDING OF SENSORIUM: ORIENTED AND CAN DO SERIAL ADDITIONS
EVER HAD A DRINK FIRST THING IN THE MORNING TO STEADY YOUR NERVES TO GET RID OF A HANGOVER: YES
EVER FELT BAD OR GUILTY ABOUT YOUR DRINKING: YES
NAUSEA AND VOMITING: MILD NAUSEA WITH NO VOMITING

## 2021-02-05 ASSESSMENT — ENCOUNTER SYMPTOMS
HALLUCINATIONS: 0
WEAKNESS: 0
CONSTIPATION: 0
EYES NEGATIVE: 1
CARDIOVASCULAR NEGATIVE: 1
NERVOUS/ANXIOUS: 1
TREMORS: 1
BLURRED VISION: 0
COUGH: 0
RESPIRATORY NEGATIVE: 1
DOUBLE VISION: 0
CHILLS: 0
DIAPHORESIS: 1
NAUSEA: 1
VOMITING: 1
PALPITATIONS: 0
DIZZINESS: 0
HEADACHES: 0
PHOTOPHOBIA: 0
FOCAL WEAKNESS: 0
MYALGIAS: 0
HEMOPTYSIS: 0
MUSCULOSKELETAL NEGATIVE: 1
DIARRHEA: 0
FEVER: 0
ABDOMINAL PAIN: 0
SHORTNESS OF BREATH: 0
NECK PAIN: 0
SEIZURES: 1

## 2021-02-05 ASSESSMENT — PATIENT HEALTH QUESTIONNAIRE - PHQ9
6. FEELING BAD ABOUT YOURSELF - OR THAT YOU ARE A FAILURE OR HAVE LET YOURSELF OR YOUR FAMILY DOWN: SEVERAL DAYS
8. MOVING OR SPEAKING SO SLOWLY THAT OTHER PEOPLE COULD HAVE NOTICED. OR THE OPPOSITE, BEING SO FIGETY OR RESTLESS THAT YOU HAVE BEEN MOVING AROUND A LOT MORE THAN USUAL: NOT AT ALL
3. TROUBLE FALLING OR STAYING ASLEEP OR SLEEPING TOO MUCH: SEVERAL DAYS
4. FEELING TIRED OR HAVING LITTLE ENERGY: SEVERAL DAYS
2. FEELING DOWN, DEPRESSED, IRRITABLE, OR HOPELESS: MORE THAN HALF THE DAYS
7. TROUBLE CONCENTRATING ON THINGS, SUCH AS READING THE NEWSPAPER OR WATCHING TELEVISION: NOT AT ALL
5. POOR APPETITE OR OVEREATING: SEVERAL DAYS
SUM OF ALL RESPONSES TO PHQ9 QUESTIONS 1 AND 2: 4
SUM OF ALL RESPONSES TO PHQ QUESTIONS 1-9: 8
9. THOUGHTS THAT YOU WOULD BE BETTER OFF DEAD, OR OF HURTING YOURSELF: NOT AT ALL
1. LITTLE INTEREST OR PLEASURE IN DOING THINGS: MORE THAN HALF THE DAYS

## 2021-02-05 ASSESSMENT — PAIN DESCRIPTION - PAIN TYPE: TYPE: ACUTE PAIN

## 2021-02-05 NOTE — ASSESSMENT & PLAN NOTE
Reports anxiety and depression, No suicidal ideation. Recently started on Zoloft taking 25mg per day  She no longer wants to continue Zoloft  Discussed risks of SSRI withdrawal, however she is on minimal dose and withdrawal unlikely  Will hold medication per patient wishes while being treated for active EtOH withdrawal  Outpatient psych follow up recommended

## 2021-02-05 NOTE — ED TRIAGE NOTES
"Pt is accompanied by family.  She reports a hx of alcohol abuse.  C/O a possible seizure episode occurred earlier today, and generalized pain.  She has been \"detoxing\" for the past 2 days (however she admits to consuming alcohol the past 2 hours).   She is tachycardic in triage, and an EKG is completed.   Chief Complaint   Patient presents with   • Detox   • Alcohol Intoxication   • Seizure   • N/V   /101   Pulse (!) 140   Temp 36.1 °C (97 °F) (Temporal)   Resp 20   Ht 1.651 m (5' 5\")   Wt 75 kg (165 lb 5.5 oz)   SpO2 98%   BMI 27.51 kg/m²           "

## 2021-02-05 NOTE — ASSESSMENT & PLAN NOTE
Patient with active alcohol withdrawal, without delirium  Will need to be admitted for CIWA protocol  Tele-monitoring  Seizure protocol  MVI, Thiamine, Folate  Clonidine and labetalol prn for HTN

## 2021-02-05 NOTE — H&P
Hospital Medicine History & Physical Note    Date of Service  2/5/2021    Primary Care Physician  MILE Su.    Consultants  None    Code Status  Full Code    Chief Complaint  Chief Complaint   Patient presents with   • Detox   • Alcohol Intoxication   • Seizure   • N/V       History of Presenting Illness  25 y.o. female with history of EtOH dependence, Anxiety/Depression recently started Zoloft, who presented 2/5/2021 with reported seizure and alcohol withdrawal.    Patient reports that she drinks daily. Is unsure of how much she drinks while binging during weeks but may be more than 750ml liquor. During week she estimates 4 drinks daily. Patient reports that for past 8 months she noticed some tremors and anxiety in morning which improves with alcohol. Patient has been trying to wean self off of alcohol, last drink 2/4. She denies any previous history of alcohol withdrawal. Patient reports seizure like activity this morning, witnessed by boyfriend. No traumatic event.    Patient also notes nausea and vomiting, unable to keep anything down for the past few days. She denies any abdominal pain, no epigastric or RUQ abdominal pain.    Patient also reports that she started Zoloft 25mg daily for depression / anxiety, but does not like taking medication and wishes to stop. She has been on other SSRIs intermittently in past.    In ED patient was tachycardic, /74, hypokalemia 3.3, Elevated LFTs: , ALT 68, Tbili 1.6, lipase 47, elevated anion gap without acidosis.    Review of Systems  Review of Systems   Constitutional: Positive for diaphoresis and malaise/fatigue. Negative for chills and fever.   HENT: Negative.  Negative for ear pain, hearing loss and tinnitus.    Eyes: Negative.  Negative for blurred vision, double vision and photophobia.   Respiratory: Negative.  Negative for cough, hemoptysis and shortness of breath.    Cardiovascular: Negative.  Negative for chest pain, palpitations and leg  swelling.   Gastrointestinal: Positive for nausea and vomiting. Negative for abdominal pain, constipation and diarrhea.   Genitourinary: Negative.  Negative for dysuria, frequency and urgency.   Musculoskeletal: Negative.  Negative for myalgias and neck pain.   Skin: Negative.  Negative for rash.   Neurological: Positive for tremors and seizures. Negative for dizziness, focal weakness, weakness and headaches.   Endo/Heme/Allergies: Negative.    Psychiatric/Behavioral: Positive for substance abuse. Negative for hallucinations and suicidal ideas. The patient is nervous/anxious.        Past Medical History   has a past medical history of Anxiety, ASTHMA, Depression, and EtOH dependence ([facility]).    Surgical History   has no past surgical history on file.     Family History  family history includes Cancer in her mother; Hypertension in her maternal grandmother.     Social History   reports that she has quit smoking. She has never used smokeless tobacco. She reports current alcohol use of about 6.0 oz of alcohol per week. She reports previous drug use.    Allergies  Allergies   Allergen Reactions   • Pcn [Penicillins] Unspecified     Childhood         Medications  Prior to Admission Medications   Prescriptions Last Dose Informant Patient Reported? Taking?   sertraline (ZOLOFT) 25 MG tablet   Yes Yes   Sig: Take 25 mg by mouth every day.      Facility-Administered Medications: None       Physical Exam  Temp:  [36.1 °C (97 °F)] 36.1 °C (97 °F)  Pulse:  [100-140] 100  Resp:  [16-20] 16  BP: (119-157)/() 119/74  SpO2:  [93 %-98 %] 97 %    Physical Exam  Constitutional:       General: She is not in acute distress.     Appearance: She is diaphoretic.   HENT:      Head: Normocephalic and atraumatic.      Right Ear: Tympanic membrane normal.      Left Ear: Tympanic membrane normal.      Nose: Nose normal.      Mouth/Throat:      Mouth: Mucous membranes are dry.      Pharynx: Oropharynx is clear.   Eyes:      Extraocular  Movements: Extraocular movements intact.      Pupils: Pupils are equal, round, and reactive to light.   Neck:      Musculoskeletal: Normal range of motion. No neck rigidity.   Cardiovascular:      Rate and Rhythm: Regular rhythm. Tachycardia present.   Pulmonary:      Effort: Pulmonary effort is normal.      Breath sounds: Normal breath sounds.   Abdominal:      General: Abdomen is flat. There is no distension.      Palpations: Abdomen is soft.      Tenderness: There is no abdominal tenderness. There is no guarding.   Musculoskeletal:      Right lower leg: No edema.      Left lower leg: No edema.   Skin:     General: Skin is warm.      Capillary Refill: Capillary refill takes less than 2 seconds.      Coloration: Skin is not jaundiced.   Neurological:      General: No focal deficit present.      Mental Status: She is alert and oriented to person, place, and time.      Comments: Mild tremors  Tongue fasciculations   Psychiatric:         Behavior: Behavior normal.         Thought Content: Thought content normal.      Comments: No suicidal ideation         Laboratory:  Recent Labs     02/05/21  1042   WBC 7.7   RBC 4.39   HEMOGLOBIN 14.7   HEMATOCRIT 42.4   MCV 96.6   MCH 33.5*   MCHC 34.7   RDW 43.8   PLATELETCT 151*   MPV 10.8     Recent Labs     02/05/21  1042   SODIUM 135   POTASSIUM 3.3*   CHLORIDE 91*   CO2 20   GLUCOSE 204*   BUN 12   CREATININE 0.99   CALCIUM 10.3*     Recent Labs     02/05/21  1042   ALTSGPT 68*   ASTSGOT 160*   ALKPHOSPHAT 76   TBILIRUBIN 1.6*   LIPASE 47   GLUCOSE 204*         No results for input(s): NTPROBNP in the last 72 hours.      No results for input(s): TROPONINT in the last 72 hours.    Imaging:  No orders to display         Assessment/Plan:  I anticipate this patient will require at least two midnights for appropriate medical management, necessitating inpatient admission.    * Alcohol withdrawal syndrome without complication (HCC)- (present on admission)  Assessment & Plan  Patient  with active alcohol withdrawal, without delirium  Will need to be admitted for CIWA protocol  Tele-monitoring  Seizure protocol  MVI, Thiamine, Folate  Clonidine and labetalol prn for HTN    Mild intermittent asthma without complication  Assessment & Plan  Reports history of Asthma as child  Has not required albuterol for years, not currently on treatment  Albuterol PRN    Hyperglycemia  Assessment & Plan  No history of DM  Will obtain A1C  Monitor for now    Seizure (HCC)- (present on admission)  Assessment & Plan  First seizure like activity due to EtOH withdrawal. No evidence of neurological deficit, no trauma  Will treat EtOH withdrawal  Seizure precautions    Psychiatric disorder- (present on admission)  Assessment & Plan  Reports anxiety and depression, No suicidal ideation. Recently started on Zoloft taking 25mg per day  She no longer wants to continue Zoloft  Discussed risks of SSRI withdrawal, however she is on minimal dose and withdrawal unlikely  Will hold medication per patient wishes while being treated for active EtOH withdrawal    Transaminitis- (present on admission)  Assessment & Plan  Elevated LFTs with classic pattern of AST > ALT consistent with EtOH  T bili mildly elevated, however no juandice or abdominal pain  Will repeat LFTs in AM, if still elevated can consider RUQ US    Dehydration  Assessment & Plan  Decreased PO intake due to nausea and vomiting  Patient also with evidence of probable EtOH ketosis with elevated anion gap, no acidosis however  UA pending  Given 2L LR in ED. Continue IVF overnight  Recheck labs in AM    Hypokalemia- (present on admission)  Assessment & Plan  Reports nausea and vomiting, likely due to GI loss  Replace  Monitor

## 2021-02-05 NOTE — ED NOTES
"Additional blanket provided. Pt resting in [facility] , reports feeling \" so much more comfy\" following medication.   "

## 2021-02-05 NOTE — ASSESSMENT & PLAN NOTE
Elevated LFTs with classic pattern of AST > ALT consistent with EtOH  Hepatitis panel  Cont monitoring

## 2021-02-05 NOTE — ASSESSMENT & PLAN NOTE
Reports history of Asthma as child  Has not required albuterol for years, not currently on treatment  Albuterol PRN

## 2021-02-05 NOTE — ASSESSMENT & PLAN NOTE
3 seizure like activities in the past 5 weeks, no hx or family hx of seizure disorder, likely sec to EtOH withdrawal  No evidence of neurological deficit, no trauma  Will treat EtOH withdrawal  Seizure precautions  Head CT  EEG  No driving/heavy machine use advised

## 2021-02-05 NOTE — ED PROVIDER NOTES
ED Provider Note    ED Provider Note    Primary care provider: Sea Gann D.O.  Means of arrival: Walk-in  History obtained from: Patient    CHIEF COMPLAINT  Chief Complaint   Patient presents with   • Detox   • Alcohol Intoxication   • Seizure   • N/V     Seen at 10:36 AM.   HPI  Mariia Samayoa is a 25 y.o. female who presents to the Emergency Department for possible detox.  The patient drinks about 1/5 of vodka over the weekend and then 3 Bud Light seltzer is a day through the week with some intermittent vodka.  She has been trying to curtail her drinking over the past few days.  She had a generalized tonic-clonic seizure this morning lasting about 30 seconds to 1 minute with some decreased responsiveness following the episode.  She reports having a similar episode about 2 weeks ago.  She recently started Zoloft 6 weeks ago.  She denies any prior history of DTs.  She denies any prior seizure disorder.  She denies any headache, neck pain, chest pain, shortness of breath.  She has had repeated vomiting for the past several days states that she has not been able to tolerate her alcohol or her Zoloft.  She denies abdominal pain, dysuria.  No numbness, weakness or bleeding diathesis.    REVIEW OF SYSTEMS  See HPI,   Remainder of ROS negative.     PAST MEDICAL HISTORY   has a past medical history of ASTHMA.    SURGICAL HISTORY  patient denies any surgical history    SOCIAL HISTORY  Social History     Tobacco Use   • Smoking status: Former Smoker   • Smokeless tobacco: Never Used   Substance Use Topics   • Alcohol use: Yes     Alcohol/week: 6.0 oz     Types: 5 Cans of beer, 5 Shots of liquor per week     Comment: Binge   • Drug use: Not Currently      Social History     Substance and Sexual Activity   Drug Use Not Currently       FAMILY HISTORY  Family History   Problem Relation Age of Onset   • Cancer Mother    • Hypertension Maternal Grandmother        CURRENT MEDICATIONS  Reviewed.  See Encounter Summary.  "    ALLERGIES  Allergies   Allergen Reactions   • Pcn [Penicillins]        PHYSICAL EXAM  VITAL SIGNS: /74   Pulse 100   Temp 36.1 °C (97 °F) (Temporal)   Resp 16   Ht 1.651 m (5' 5\")   Wt 75 kg (165 lb 5.5 oz)   SpO2 97%   BMI 27.51 kg/m²   Constitutional: Awake, alert in no apparent distress.  HENT: Normocephalic, Bilateral external ears normal. Nose normal.   Eyes: Conjunctiva normal, non-icteric, EOMI.    Thorax & Lungs: Easy unlabored respirations, Clear to ascultation bilaterally.  Cardiovascular: Tachycardic, No murmurs, rubs or gallops. Bilateral pulses symmetrical.   Abdomen:  Soft, nontender, nondistended, normal active bowel sounds.   :    Skin: Visualized skin is  Dry, No erythema, No rash.   Musculoskeletal:   No cyanosis, clubbing or edema. No leg asymmetry.   Neurologic: Alert, Grossly non-focal.  Tremulous.  Psychiatric: Normal affect, Normal mood  Lymphatic:  No cervical LAD    EKG   12 lead Interpreted by me  Rhythm: Sinus tach  Rate: 113  Axis: normal  Ectopy: none  Conduction: normal  ST Segments: no acute change  T Waves: no acute change  Clinical Impression: Sinus tachycardia, otherwise unremarkable EKG.    RADIOLOGY  No orders to display         COURSE & MEDICAL DECISION MAKING  Pertinent Labs & Imaging studies reviewed. (See chart for details)    Differential diagnoses include but are not limited to: DTs, electrolyte abnormalities    10:36 AM - Medical record reviewed, patient seen and examined at bedside.  Patient received 2 mg of IV Ativan, 1 L of normal saline for tachycardia.  Due to the time constraints the emergency department I do not think it is appropriate to do a p.o. rehydration tachycardic patient, also she has had significant vomiting prior to arrival.    12:29 PM-patient with significant reduction in tachycardia after 1 L of IV fluids and 2 mg of Ativan.  Tremulousness has decreased as well.  Hypertension has resolved.    1:18 PM-we discussed discharge.  The " patient initially was receptive to the idea but after her cousin represented who apparently is going to care for her, she is now nervous about being discharged.  Because she does have electrolyte abnormalities, significant withdrawal symptoms and has not presented or been treated for this in the past it would be reasonable to observe the patient, tune her up overnight.    Decision Making:  This is a 25 y.o. year old female who presents with alcohol withdrawal.  I suspect the patient is underestimating how much alcohol she drinks.  She has a transaminitis of 2-1 with AST of 160.  She presented tremulous with a heart rate in the 140s, hypertensive.  Apparently she had symptoms of generalized tonic-clonic seizure at home.  She received 2 mg of IV Ativan with significant reduction in her blood pressure as well as heart rate.    Labs show a mild hypokalemia at 3.3 which can be repleted with p.o. potassium as well as LR.  She also is mildly hypochloremic.  Anion gap of 24 with a bicarb of 20.  I suspect this is some starvation ketoacidosis or possibly alcoholic ketoacidosis.  Glucose of 204 presumably a stress response, the patient does not have underlying diabetes, I do not feel this represents DKA.    Because they are very nervous about the patient going home and she did apparently have a seizure earlier today and has electrolyte derangement, it would be reasonable to place the patient in observation for IV fluids and benzodiazepines.  I explained that overall this should be a short stay and I would anticipate discharge in the next 24 hours.        FINAL IMPRESSION  1. Alcohol withdrawal syndrome without complication (HCC)    2. Dehydration    3. Alcoholic ketoacidosis

## 2021-02-05 NOTE — ED NOTES
Pharmacy Medication Reconciliation      Medication reconciliation updated and complete per pt at bedside  Allergies have been verified and updated   No oral ABX within the last 14 days  Patient home pharmacy:[facility]

## 2021-02-06 ENCOUNTER — APPOINTMENT (OUTPATIENT)
Dept: RADIOLOGY | Facility: MEDICAL CENTER | Age: 26
DRG: 894 | End: 2021-02-06
Attending: STUDENT IN AN ORGANIZED HEALTH CARE EDUCATION/TRAINING PROGRAM
Payer: COMMERCIAL

## 2021-02-06 VITALS
HEIGHT: 65 IN | RESPIRATION RATE: 16 BRPM | OXYGEN SATURATION: 95 % | HEART RATE: 88 BPM | TEMPERATURE: 98.6 F | WEIGHT: 173.94 LBS | BODY MASS INDEX: 28.98 KG/M2 | DIASTOLIC BLOOD PRESSURE: 91 MMHG | SYSTOLIC BLOOD PRESSURE: 141 MMHG

## 2021-02-06 PROBLEM — F10.10 ALCOHOL ABUSE: Status: ACTIVE | Noted: 2021-02-06

## 2021-02-06 PROBLEM — E83.39 HYPOPHOSPHATEMIA: Status: ACTIVE | Noted: 2021-02-06

## 2021-02-06 LAB
ALBUMIN SERPL BCP-MCNC: 3.8 G/DL (ref 3.2–4.9)
ALBUMIN/GLOB SERPL: 1.8 G/DL
ALP SERPL-CCNC: 62 U/L (ref 30–99)
ALT SERPL-CCNC: 51 U/L (ref 2–50)
ANION GAP SERPL CALC-SCNC: 13 MMOL/L (ref 7–16)
AST SERPL-CCNC: 113 U/L (ref 12–45)
BILIRUB SERPL-MCNC: 1.2 MG/DL (ref 0.1–1.5)
BUN SERPL-MCNC: 7 MG/DL (ref 8–22)
CALCIUM SERPL-MCNC: 8.6 MG/DL (ref 8.4–10.2)
CHLORIDE SERPL-SCNC: 98 MMOL/L (ref 96–112)
CO2 SERPL-SCNC: 26 MMOL/L (ref 20–33)
CREAT SERPL-MCNC: 0.8 MG/DL (ref 0.5–1.4)
ERYTHROCYTE [DISTWIDTH] IN BLOOD BY AUTOMATED COUNT: 45.6 FL (ref 35.9–50)
EST. AVERAGE GLUCOSE BLD GHB EST-MCNC: 114 MG/DL
GLOBULIN SER CALC-MCNC: 2.1 G/DL (ref 1.9–3.5)
GLUCOSE SERPL-MCNC: 77 MG/DL (ref 65–99)
HBA1C MFR BLD: 5.6 % (ref 0–5.6)
HCT VFR BLD AUTO: 38.2 % (ref 37–47)
HGB BLD-MCNC: 12.6 G/DL (ref 12–16)
MAGNESIUM SERPL-MCNC: 2.4 MG/DL (ref 1.5–2.5)
MCH RBC QN AUTO: 33.2 PG (ref 27–33)
MCHC RBC AUTO-ENTMCNC: 33 G/DL (ref 33.6–35)
MCV RBC AUTO: 100.8 FL (ref 81.4–97.8)
PHOSPHATE SERPL-MCNC: 1.6 MG/DL (ref 2.5–4.5)
PLATELET # BLD AUTO: 110 K/UL (ref 164–446)
PMV BLD AUTO: 10.9 FL (ref 9–12.9)
POTASSIUM SERPL-SCNC: 3.8 MMOL/L (ref 3.6–5.5)
PROT SERPL-MCNC: 5.9 G/DL (ref 6–8.2)
RBC # BLD AUTO: 3.79 M/UL (ref 4.2–5.4)
SARS-COV-2 RNA RESP QL NAA+PROBE: NOTDETECTED
SODIUM SERPL-SCNC: 137 MMOL/L (ref 135–145)
SPECIMEN SOURCE: NORMAL
WBC # BLD AUTO: 7 K/UL (ref 4.8–10.8)

## 2021-02-06 PROCEDURE — A9270 NON-COVERED ITEM OR SERVICE: HCPCS | Performed by: STUDENT IN AN ORGANIZED HEALTH CARE EDUCATION/TRAINING PROGRAM

## 2021-02-06 PROCEDURE — 85027 COMPLETE CBC AUTOMATED: CPT

## 2021-02-06 PROCEDURE — 83735 ASSAY OF MAGNESIUM: CPT

## 2021-02-06 PROCEDURE — 99239 HOSP IP/OBS DSCHRG MGMT >30: CPT | Performed by: STUDENT IN AN ORGANIZED HEALTH CARE EDUCATION/TRAINING PROGRAM

## 2021-02-06 PROCEDURE — 84100 ASSAY OF PHOSPHORUS: CPT

## 2021-02-06 PROCEDURE — 700105 HCHG RX REV CODE 258: Performed by: STUDENT IN AN ORGANIZED HEALTH CARE EDUCATION/TRAINING PROGRAM

## 2021-02-06 PROCEDURE — 80074 ACUTE HEPATITIS PANEL: CPT

## 2021-02-06 PROCEDURE — 80053 COMPREHEN METABOLIC PANEL: CPT

## 2021-02-06 PROCEDURE — 700102 HCHG RX REV CODE 250 W/ 637 OVERRIDE(OP): Performed by: STUDENT IN AN ORGANIZED HEALTH CARE EDUCATION/TRAINING PROGRAM

## 2021-02-06 PROCEDURE — 700111 HCHG RX REV CODE 636 W/ 250 OVERRIDE (IP): Performed by: STUDENT IN AN ORGANIZED HEALTH CARE EDUCATION/TRAINING PROGRAM

## 2021-02-06 PROCEDURE — 70450 CT HEAD/BRAIN W/O DYE: CPT

## 2021-02-06 RX ORDER — LANOLIN ALCOHOL/MO/W.PET/CERES
100 CREAM (GRAM) TOPICAL DAILY
Qty: 30 TAB | Refills: 0 | Status: SHIPPED | OUTPATIENT
Start: 2021-02-07 | End: 2021-03-09

## 2021-02-06 RX ORDER — FOLIC ACID 1 MG/1
1 TABLET ORAL DAILY
Qty: 30 TAB | Refills: 0 | Status: SHIPPED | OUTPATIENT
Start: 2021-02-07 | End: 2021-03-09

## 2021-02-06 RX ADMIN — FOLIC ACID 1 MG: 1 TABLET ORAL at 05:24

## 2021-02-06 RX ADMIN — LORAZEPAM 1 MG: 1 TABLET ORAL at 15:00

## 2021-02-06 RX ADMIN — THERA TABS 1 TABLET: TAB at 05:23

## 2021-02-06 RX ADMIN — LORAZEPAM 1 MG: 1 TABLET ORAL at 01:13

## 2021-02-06 RX ADMIN — LORAZEPAM 1 MG: 2 INJECTION INTRAMUSCULAR; INTRAVENOUS at 12:58

## 2021-02-06 RX ADMIN — SODIUM CHLORIDE, POTASSIUM CHLORIDE, SODIUM LACTATE AND CALCIUM CHLORIDE: 600; 310; 30; 20 INJECTION, SOLUTION INTRAVENOUS at 04:49

## 2021-02-06 RX ADMIN — LORAZEPAM 0.5 MG: 0.5 TABLET ORAL at 09:07

## 2021-02-06 RX ADMIN — LORAZEPAM 1 MG: 1 TABLET ORAL at 05:24

## 2021-02-06 RX ADMIN — THIAMINE HCL TAB 100 MG 100 MG: 100 TAB at 05:24

## 2021-02-06 RX ADMIN — ENOXAPARIN SODIUM 40 MG: 40 INJECTION SUBCUTANEOUS at 05:23

## 2021-02-06 ASSESSMENT — LIFESTYLE VARIABLES
ORIENTATION AND CLOUDING OF SENSORIUM: ORIENTED AND CAN DO SERIAL ADDITIONS
VISUAL DISTURBANCES: NOT PRESENT
NAUSEA AND VOMITING: MILD NAUSEA WITH NO VOMITING
NAUSEA AND VOMITING: NO NAUSEA AND NO VOMITING
ANXIETY: *
PAROXYSMAL SWEATS: *
TOTAL SCORE: 6
AGITATION: NORMAL ACTIVITY
TREMOR: *
TOTAL SCORE: 13
HEADACHE, FULLNESS IN HEAD: NOT PRESENT
AGITATION: *
PAROXYSMAL SWEATS: *
AUDITORY DISTURBANCES: NOT PRESENT
AUDITORY DISTURBANCES: NOT PRESENT
TREMOR: *
TREMOR: *
VISUAL DISTURBANCES: NOT PRESENT
TOTAL SCORE: 8
ANXIETY: *
TOTAL SCORE: 9
ORIENTATION AND CLOUDING OF SENSORIUM: ORIENTED AND CAN DO SERIAL ADDITIONS
VISUAL DISTURBANCES: NOT PRESENT
TREMOR: *
AUDITORY DISTURBANCES: NOT PRESENT
TOTAL SCORE: 9
HEADACHE, FULLNESS IN HEAD: VERY MILD
PAROXYSMAL SWEATS: BARELY PERCEPTIBLE SWEATING. PALMS MOIST
PAROXYSMAL SWEATS: *
NAUSEA AND VOMITING: NO NAUSEA AND NO VOMITING
AGITATION: SOMEWHAT MORE THAN NORMAL ACTIVITY
NAUSEA AND VOMITING: NO NAUSEA AND NO VOMITING
ANXIETY: *
ORIENTATION AND CLOUDING OF SENSORIUM: ORIENTED AND CAN DO SERIAL ADDITIONS
VISUAL DISTURBANCES: NOT PRESENT
VISUAL DISTURBANCES: NOT PRESENT
AGITATION: NORMAL ACTIVITY
ANXIETY: *
HEADACHE, FULLNESS IN HEAD: VERY MILD
HEADACHE, FULLNESS IN HEAD: VERY MILD
AUDITORY DISTURBANCES: NOT PRESENT
NAUSEA AND VOMITING: NO NAUSEA AND NO VOMITING
TREMOR: *
AUDITORY DISTURBANCES: NOT PRESENT
ORIENTATION AND CLOUDING OF SENSORIUM: ORIENTED AND CAN DO SERIAL ADDITIONS
AGITATION: NORMAL ACTIVITY
PAROXYSMAL SWEATS: BARELY PERCEPTIBLE SWEATING. PALMS MOIST
HEADACHE, FULLNESS IN HEAD: VERY MILD
ANXIETY: *
ORIENTATION AND CLOUDING OF SENSORIUM: ORIENTED AND CAN DO SERIAL ADDITIONS

## 2021-02-06 ASSESSMENT — ENCOUNTER SYMPTOMS
FEVER: 0
MYALGIAS: 0
BLURRED VISION: 0
COUGH: 0
NAUSEA: 0
HEMOPTYSIS: 0
CHILLS: 0
BRUISES/BLEEDS EASILY: 0
NERVOUS/ANXIOUS: 1
TREMORS: 1
VOMITING: 0
PALPITATIONS: 0
FOCAL WEAKNESS: 0
DEPRESSION: 0

## 2021-02-06 ASSESSMENT — FIBROSIS 4 INDEX: FIB4 SCORE: 3.34

## 2021-02-06 ASSESSMENT — PAIN DESCRIPTION - PAIN TYPE: TYPE: ACUTE PAIN

## 2021-02-06 NOTE — PROGRESS NOTES
Received report from Nikhil HOOKS Patient up to bathroom and back to bed, no complaints at this time. Bed alarm ON, safety precautions in place, will continue to monitor.

## 2021-02-06 NOTE — PROGRESS NOTES
This patient has chosen to leave the hospital against medical advice. The attending physician has not discharged the patient. Patient is not a risk to himself or others. I have discussed with the patient the following: abstaining from alcohol and drugs; signs of alcohol withdrawl such as nausea and vomiting, tremors, headache, disorientation, tactile, auditory or visual disturbances, sweating, increased anxiety or agitation; and the risk of having another seizure from alcohol withdrawal.    Attending physician has been notified.    IV and telemetry monitored removed before patient left.

## 2021-02-06 NOTE — PROGRESS NOTES
Received bedside patient report from SAKINA Thomson. Patient resting comfortably in bed, no complaints at this time. Safety precautions in place. Will continue to monitor.

## 2021-02-06 NOTE — PROGRESS NOTES
Telemetry Shift Summary    Rhythm SR/ST  HR Range , up to 160's when ambulating  Ectopy none  Measurements 0.20/0.08/0.40        Normal Values  Rhythm SR  HR Range    Measurements 0.12-0.20 / 0.06-0.10  / 0.30-0.52

## 2021-02-06 NOTE — CARE PLAN
Problem: Communication  Goal: The ability to communicate needs accurately and effectively will improve  Outcome: PROGRESSING AS EXPECTED  Note: Patient updated on the plan of care. Encouraged to voice feelings and to ask questions. Answered all questions and concerns. Agrees with the plan of care.      Problem: Safety  Goal: Will remain free from injury  Outcome: PROGRESSING AS EXPECTED  Note: Safety precautions in place. Bed alarm ON. Will continue to monitor patient.   Goal: Will remain free from falls  Outcome: PROGRESSING AS EXPECTED  Note: Safety precautions in place. Bed alarm ON. Will continue to monitor patient.      Problem: Infection  Goal: Will remain free from infection  Outcome: PROGRESSING AS EXPECTED  Note: Discussed the importance of infection prevention and hand hygiene.

## 2021-02-06 NOTE — PROGRESS NOTES
Hospital Medicine Daily Progress Note    Date of Service  2/6/2021    Chief Complaint  25 y.o. female admitted 2/5/2021 with   Chief Complaint   Patient presents with   • Detox   • Alcohol Intoxication   • Seizure   • N/V         Hospital Course  25 y.o. female with history of EtOH dependence, Anxiety/Depression recently started Zoloft, who presented 2/5/2021 with reported seizure and alcohol withdrawal.    Patient reports that she drinks daily.  She is unsure of how much she drinks while binging during weeks but may be more than 750ml liquor. During week she estimates 4 drinks daily. Patient reports that for past 8 months she noticed some tremors and anxiety in morning which improves with alcohol. Patient has been trying to wean self off of alcohol, last drink 2/4. She denies any previous history of alcohol withdrawal. Patient reports seizure like activities 3 episodes in the past 5 weeks.      Patient also notes nausea and vomiting, unable to keep anything down for the past few days. She denies any abdominal pain, no epigastric or RUQ abdominal pain.     Patient also reports that she started Zoloft 25mg daily for depression / anxiety, but does not like taking medication and wishes to stop. She has been on other SSRIs intermittently in past.     In ED patient was tachycardic, /74, hypokalemia 3.3, Elevated LFTs: , ALT 68, Tbili 1.6, lipase 47, elevated anion gap without acidosis.    She is on ciwa, mltivitamine/folate/B1. Head CT and EEG ordered.    CM to give alcohol cessation resources    Interval Problem Update  Patient was seen and examined at the bedside.   VS reviewed.   Patient reports excessive alcohol drinking histories. No fever, chills, chest pain, sob, or abdominal pain    Consultants/Specialty  none    Code Status  Full Code    Disposition  TBD    Review of Systems  Review of Systems   Constitutional: Negative for chills and fever.   HENT: Negative for ear discharge.    Eyes: Negative for  blurred vision.   Respiratory: Negative for cough and hemoptysis.    Cardiovascular: Negative for chest pain and palpitations.   Gastrointestinal: Negative for nausea and vomiting.   Genitourinary: Negative for dysuria.   Musculoskeletal: Negative for myalgias.   Skin: Negative for rash.   Neurological: Positive for tremors. Negative for focal weakness.   Endo/Heme/Allergies: Does not bruise/bleed easily.   Psychiatric/Behavioral: Negative for depression. The patient is nervous/anxious.         Physical Exam  Temp:  [36.4 °C (97.5 °F)-37.3 °C (99.2 °F)] 37 °C (98.6 °F)  Pulse:  [] 102  Resp:  [18] 18  BP: (117-138)/(75-93) 138/93  SpO2:  [95 %-100 %] 96 %    Physical Exam  Vitals signs and nursing note reviewed.   Constitutional:       General: She is not in acute distress.     Appearance: Normal appearance.   HENT:      Head: Normocephalic and atraumatic.      Nose: Nose normal.      Mouth/Throat:      Pharynx: Oropharynx is clear.   Eyes:      Extraocular Movements: Extraocular movements intact.      Conjunctiva/sclera: Conjunctivae normal.      Pupils: Pupils are equal, round, and reactive to light.   Neck:      Musculoskeletal: Normal range of motion and neck supple.   Cardiovascular:      Rate and Rhythm: Regular rhythm. Tachycardia present.      Pulses: Normal pulses.      Heart sounds: Normal heart sounds.   Pulmonary:      Effort: Pulmonary effort is normal.      Breath sounds: Normal breath sounds.   Abdominal:      General: Abdomen is flat. Bowel sounds are normal.      Palpations: Abdomen is soft.   Musculoskeletal: Normal range of motion.         General: No swelling or tenderness.   Skin:     General: Skin is warm and dry.   Neurological:      General: No focal deficit present.      Mental Status: She is alert and oriented to person, place, and time. Mental status is at baseline.   Psychiatric:         Mood and Affect: Mood normal.         Behavior: Behavior normal.         Fluids    Intake/Output  Summary (Last 24 hours) at 2/6/2021 1153  Last data filed at 2/6/2021 0745  Gross per 24 hour   Intake 60 ml   Output 600 ml   Net -540 ml       Laboratory  Recent Labs     02/05/21  1042 02/06/21  0336   WBC 7.7 7.0   RBC 4.39 3.79*   HEMOGLOBIN 14.7 12.6   HEMATOCRIT 42.4 38.2   MCV 96.6 100.8*   MCH 33.5* 33.2*   MCHC 34.7 33.0*   RDW 43.8 45.6   PLATELETCT 151* 110*   MPV 10.8 10.9     Recent Labs     02/05/21  1042 02/06/21  0336   SODIUM 135 137   POTASSIUM 3.3* 3.8   CHLORIDE 91* 98   CO2 20 26   GLUCOSE 204* 77   BUN 12 7*   CREATININE 0.99 0.80   CALCIUM 10.3* 8.6                   Imaging  CT-HEAD W/O    (Results Pending)        Assessment/Plan  * Alcohol withdrawal syndrome without complication (HCC)- (present on admission)  Assessment & Plan  Patient with active alcohol withdrawal, without delirium  Will need to be admitted for CIWA protocol  Tele-monitoring  Seizure protocol  MVI, Thiamine, Folate  Clonidine and labetalol prn for HTN    Seizure (HCC)- (present on admission)  Assessment & Plan  3 seizure like activities in the past 5 weeks, no hx or family hx of seizure disorder, likely sec to EtOH withdrawal  No evidence of neurological deficit, no trauma  Will treat EtOH withdrawal  Seizure precautions  Head CT  EEG  No driving/heavy machine use advised      Alcohol abuse  Assessment & Plan  Complication associated with alcohol abuse discussed extensively with the patient. Alcohol cessation strongly advised, patient understood and is willing to quit alcohol intake   to give alcohol cessation resources to the patient    Psychiatric disorder- (present on admission)  Assessment & Plan  Reports anxiety and depression, No suicidal ideation. Recently started on Zoloft taking 25mg per day  She no longer wants to continue Zoloft  Discussed risks of SSRI withdrawal, however she is on minimal dose and withdrawal unlikely  Will hold medication per patient wishes while being treated for active EtOH  withdrawal  Outpatient psych follow up recommended    Hypophosphatemia  Assessment & Plan  Replace as indicated  Cont monitoring    Mild intermittent asthma without complication  Assessment & Plan  Reports history of Asthma as child  Has not required albuterol for years, not currently on treatment  Albuterol PRN    Hyperglycemia  Assessment & Plan  No history of DM  Will obtain A1C 5.7  Monitor for now    Transaminitis- (present on admission)  Assessment & Plan  Elevated LFTs with classic pattern of AST > ALT consistent with EtOH  Hepatitis panel  Cont monitoring    Dehydration  Assessment & Plan  Decreased PO intake due to nausea and vomiting  On IVF      Hypokalemia- (present on admission)  Assessment & Plan  Reports nausea and vomiting, likely due to GI loss  Replace  Monitor       VTE prophylaxis: lovenox

## 2021-02-06 NOTE — PROGRESS NOTES
Pt arrived to unit via gurney. Ambulated from rney to bed, one person assist. Tele monitor applied, vitals taken. Pt assessed. A&Ox4. Admit profile and med rec complete. Discussed POC with pt, including IV fluids, electrolyte replacement, CIWA, seizure precautions. Welcome folder provided and discussed. Communication board filled out. Questions and concerns addressed, verbalized understanding. Fall precautions in place. Pt demonstrates ability to use call light appropriately. Pt left in lowest position. Bed locked and low.

## 2021-02-06 NOTE — PROGRESS NOTES
2 RN skin check complete with Gayathri Valentin RN.  Devices in place telemetry monitor, PIV.  Skin assessed under devices intact.  Confirmed pressure ulcers found on N/A.   New potential pressure ulcers noted on N/A. Wound consult placed and wound reported.   The following interventions in place N/A.

## 2021-02-06 NOTE — PROGRESS NOTES
"Pt anxious stating \"I don't want to be here anymore, I just want to go home. I can't do it.\" This RN explained the importance of staying for treatment to prevent further alcohol detox or seizure. Pt states \"I know, I don't care. I just want to go home.\" MD Patel and charge nurse Marie notified.  "

## 2021-02-06 NOTE — CARE PLAN
Problem: Communication  Goal: The ability to communicate needs accurately and effectively will improve  Outcome: PROGRESSING AS EXPECTED     Problem: Safety  Goal: Will remain free from injury  Outcome: PROGRESSING AS EXPECTED  Goal: Will remain free from falls  Outcome: PROGRESSING AS EXPECTED     Problem: Infection  Goal: Will remain free from infection  Outcome: PROGRESSING AS EXPECTED     Problem: Venous Thromboembolism (VTW)/Deep Vein Thrombosis (DVT) Prevention:  Goal: Patient will participate in Venous Thrombosis (VTE)/Deep Vein Thrombosis (DVT)Prevention Measures  Outcome: PROGRESSING AS EXPECTED     Problem: Bowel/Gastric:  Goal: Normal bowel function is maintained or improved  Outcome: PROGRESSING AS EXPECTED  Goal: Will not experience complications related to bowel motility  Outcome: PROGRESSING AS EXPECTED     Problem: Knowledge Deficit  Goal: Knowledge of disease process/condition, treatment plan, diagnostic tests, and medications will improve  Outcome: PROGRESSING AS EXPECTED  Goal: Knowledge of the prescribed therapeutic regimen will improve  Outcome: PROGRESSING AS EXPECTED     Problem: Discharge Barriers/Planning  Goal: Patient's continuum of care needs will be met  Outcome: PROGRESSING AS EXPECTED     Problem: Pain Management  Goal: Pain level will decrease to patient's comfort goal  Outcome: PROGRESSING AS EXPECTED     Problem: Fluid Volume:  Goal: Will maintain balanced intake and output  Outcome: PROGRESSING AS EXPECTED     Problem: Psychosocial Needs:  Goal: Level of anxiety will decrease  Outcome: PROGRESSING AS EXPECTED     Problem: Safety:  Goal: Will remain free from injury  Outcome: PROGRESSING AS EXPECTED     Problem: Respiratory:  Goal: Ability to maintain adequate ventilation will improve  Outcome: PROGRESSING AS EXPECTED     Problem: Physical Regulation:  Goal: Ability to maintain clinical measurements within normal limits will improve  Outcome: PROGRESSING AS EXPECTED  Goal:  Complications related to the disease process, condition or treatment will be avoided or minimized  Outcome: PROGRESSING AS EXPECTED     Problem: Knowledge Deficit:  Goal: Knowledge of disease process/condition, treatment plan, diagnostic tests, and medications will improve  Outcome: PROGRESSING AS EXPECTED  Goal: Understanding of discharge needs will improve  Outcome: PROGRESSING AS EXPECTED     Problem: Health Behavior:  Goal: Ability to identify changes in lifestyle to reduce recurrence of condition will improve  Outcome: PROGRESSING AS EXPECTED  Goal: Identification of resources available to assist in meeting health care needs will improve  Outcome: PROGRESSING AS EXPECTED   Pt A&Ox4. VSS, tachycardic. CIWA of 10, 1mg ativan given. Complains of 2/10 generalized soreness. On RA. Abdomen soft, non tender. Complains of nausea, PRN compazine given. Voids in bathroom. Seizure and aspiration precautions maintained. OOB with one person assist. Call light within reach, no falls this shift.

## 2021-02-06 NOTE — PROGRESS NOTES
"Pt very anxious, rocking back and forth, crying. Stating \"I'm so over this medical stuff, I just want to go home\". Pt reeducated on importance of staying for remaining tests and treatments to prevent further detoxing and seizures. Pt agreegable to stay.  "

## 2021-02-06 NOTE — DISCHARGE SUMMARY
Discharge Summary    CHIEF COMPLAINT ON ADMISSION  Chief Complaint   Patient presents with   • Detox   • Alcohol Intoxication   • Seizure   • N/V       Reason for Admission  Seizure,ETOH detox     Admission Date  2/5/2021    CODE STATUS  Full Code    HPI & HOSPITAL COURSE  25 y.o. female with history of EtOH dependence, Anxiety/Depression recently started Zoloft, who presented 2/5/2021 with reported seizure and alcohol withdrawal.    Patient reports that she drinks daily.  She is unsure of how much she drinks while binging during weeks but may be more than 750ml liquor. During week she estimates 4 drinks daily. Patient reports that for past 8 months she noticed some tremors and anxiety in morning which improves with alcohol. Patient has been trying to wean self off of alcohol, last drink 2/4. She denies any previous history of alcohol withdrawal. Patient reports seizure like activities 3 episodes in the past 5 weeks. Patient also notes nausea and vomiting, unable to keep anything down for the past few days. She denies any abdominal pain, no epigastric or RUQ abdominal pain. Patient also reports that she started Zoloft 25mg daily for depression / anxiety, but does not like taking medication and wishes to stop. She has been on other SSRIs intermittently in past.     In ED patient was tachycardic, /74, hypokalemia 3.3, Elevated LFTs: , ALT 68, Tbili 1.6, lipase 47, elevated anion gap without acidosis.     She is on ciwa, mltivitamine/folate/B1. Given her 3 seizure activities in the past 5 weeks, Head CT and EEG ordered.     However patient wanted to leave the hospital AGAINST MEDICAL ADVICE. I examined and discussed extensively with patient at bedside.  Prolonged time spent with patient discussing the risk of leaving the hospital AGAINST MEDICAL ADVICE.  At this moment patient is alert oriented x4 and from my best clinical knowledge and judgment patient does have decision-making capacity. Patient is not a  risk to herself or others. I explained to the patient the risk including worsening of medical condition, not able to have enough medical care, worse case scenario can be even death.  Patient understood and still wants to leave the hospital AGAINST MEDICAL ADVICE. Patient currently does not have risk of imminent death. Per my evaluation and nursing staff evaluation patient does have capacity to make medical decision. At this moment I will respect patient's decision and I will provide patient with multivitamin, folate and vitB1 as prescriptions for the best interest of patient. I asked patient to quit drinking and call AA for additional resources. I asked patient to follow up with PCP and consider psychiatry and neurology follow up for seizures. I advised her not to drive or operate heavy machine due to history of seizure.    Patient left the hospital AGAINST MEDICAL ADVICE    Therefore, she is discharged in fair and stable condition against medcial advice.    Discharge Date  2/6/2021    FOLLOW UP ITEMS POST DISCHARGE  PCP  Psychiatry  Neurology    DISCHARGE DIAGNOSES  Principal Problem:    Alcohol withdrawal syndrome without complication (HCC) POA: Yes  Active Problems:    Seizure (HCC) POA: Yes    Psychiatric disorder POA: Yes    Alcohol abuse POA: Unknown    Hypokalemia POA: Yes    Dehydration POA: Unknown    Transaminitis POA: Yes    Hyperglycemia POA: Unknown    Mild intermittent asthma without complication POA: Unknown    Hypophosphatemia POA: Unknown  Resolved Problems:    Depression POA: Unknown      FOLLOW UP  No future appointments.  [facility]  [address] [place] [address] [id number] [phone]    Can be helpful for alcohol withdrawal      MEDICATIONS ON DISCHARGE     Medication List      START taking these medications      Instructions   folic acid 1 MG Tabs  Start taking on: February 7, 2021  Commonly known as: FOLVITE   Take 1 Tab by mouth every  day for 30 days.  Dose: 1 mg     LORazepam 1 MG Tabs  Commonly known as: ATIVAN   Doctor's comments: Take 3 times daily and as needed for alcohol withdrawal.  Gradually taper over the next few days.  Take 1 Tab by mouth every four hours as needed for Anxiety for up to 5 days. Indications: Alcohol Withdrawal Syndrome  Dose: 1 mg     multivitamin Tabs  Start taking on: February 7, 2021   Take 1 Tab by mouth every day for 30 days.  Dose: 1 Tab     naproxen 500 MG Tabs  Commonly known as: NAPROSYN   Take 1 Tab by mouth 2 times a day, with meals.  Dose: 500 mg     ondansetron 4 MG Tbdp  Commonly known as: Zofran ODT   Take 1 Tab by mouth every 6 hours as needed for Nausea.  Dose: 4 mg     thiamine 100 MG tablet  Start taking on: February 7, 2021  Commonly known as: THIAMINE   Take 1 Tab by mouth every day for 30 days.  Dose: 100 mg        CONTINUE taking these medications      Instructions   ASHWAGANDHA PO   Take 1 Tab by mouth every evening.  Dose: 1 Tab     Zoloft 25 MG tablet  Generic drug: sertraline   Take 12.5 mg by mouth every morning.  Dose: 12.5 mg        STOP taking these medications    TRAZODONE HCL PO            Allergies  Allergies   Allergen Reactions   • Pcn [Penicillins] Unspecified     Childhood         DIET  Orders Placed This Encounter   Procedures   • Diet Order Diet: Regular     Standing Status:   Standing     Number of Occurrences:   1     Order Specific Question:   Diet:     Answer:   Regular [1]       ACTIVITY  As tolerated.  Weight bearing as tolerated    CONSULTATIONS  none    PROCEDURES  none    LABORATORY  Lab Results   Component Value Date    SODIUM 137 02/06/2021    POTASSIUM 3.8 02/06/2021    CHLORIDE 98 02/06/2021    CO2 26 02/06/2021    GLUCOSE 77 02/06/2021    BUN 7 (L) 02/06/2021    CREATININE 0.80 02/06/2021        Lab Results   Component Value Date    WBC 7.0 02/06/2021    HEMOGLOBIN 12.6 02/06/2021    HEMATOCRIT 38.2 02/06/2021    PLATELETCT 110 (L) 02/06/2021        Total time of  the discharge process exceeds 40 minutes.

## 2021-02-06 NOTE — ASSESSMENT & PLAN NOTE
Complication associated with alcohol abuse discussed extensively with the patient. Alcohol cessation strongly advised, patient understood and is willing to quit alcohol intake   to give alcohol cessation resources to the patient

## 2021-02-08 LAB
HAV IGM SERPL QL IA: NORMAL
HBV CORE IGM SER QL: NORMAL
HBV SURFACE AG SER QL: NORMAL
HCV AB SER QL: NORMAL

## 2021-09-28 ENCOUNTER — IMMUNIZATION (OUTPATIENT)
Dept: OCCUPATIONAL MEDICINE | Facility: CLINIC | Age: 26
End: 2021-09-28

## 2021-09-28 DIAGNOSIS — Z23 NEED FOR VACCINATION: Primary | ICD-10-CM

## 2021-09-28 PROCEDURE — 90686 IIV4 VACC NO PRSV 0.5 ML IM: CPT | Performed by: NURSE PRACTITIONER

## 2022-11-29 ENCOUNTER — HOSPITAL ENCOUNTER (INPATIENT)
Facility: MEDICAL CENTER | Age: 27
LOS: 2 days | DRG: 100 | End: 2022-12-02
Attending: EMERGENCY MEDICINE | Admitting: INTERNAL MEDICINE
Payer: COMMERCIAL

## 2022-11-29 DIAGNOSIS — F10.10 ALCOHOL ABUSE: ICD-10-CM

## 2022-11-29 DIAGNOSIS — F10.930 ALCOHOL WITHDRAWAL SYNDROME WITHOUT COMPLICATION (HCC): ICD-10-CM

## 2022-11-29 DIAGNOSIS — R56.9 SEIZURE (HCC): ICD-10-CM

## 2022-11-29 LAB
ALBUMIN SERPL BCP-MCNC: 4.5 G/DL (ref 3.2–4.9)
ALBUMIN/GLOB SERPL: 1.8 G/DL
ALP SERPL-CCNC: 126 U/L (ref 30–99)
ALT SERPL-CCNC: 39 U/L (ref 2–50)
ANION GAP SERPL CALC-SCNC: 11 MMOL/L (ref 7–16)
AST SERPL-CCNC: 60 U/L (ref 12–45)
BASOPHILS # BLD AUTO: 0.4 % (ref 0–1.8)
BASOPHILS # BLD: 0.02 K/UL (ref 0–0.12)
BILIRUB SERPL-MCNC: 0.3 MG/DL (ref 0.1–1.5)
BUN SERPL-MCNC: 8 MG/DL (ref 8–22)
CALCIUM SERPL-MCNC: 9.5 MG/DL (ref 8.5–10.5)
CHLORIDE SERPL-SCNC: 103 MMOL/L (ref 96–112)
CO2 SERPL-SCNC: 28 MMOL/L (ref 20–33)
CREAT SERPL-MCNC: 0.56 MG/DL (ref 0.5–1.4)
EOSINOPHIL # BLD AUTO: 0.02 K/UL (ref 0–0.51)
EOSINOPHIL NFR BLD: 0.4 % (ref 0–6.9)
ERYTHROCYTE [DISTWIDTH] IN BLOOD BY AUTOMATED COUNT: 44 FL (ref 35.9–50)
ETHANOL BLD-MCNC: <10.1 MG/DL
GFR SERPLBLD CREATININE-BSD FMLA CKD-EPI: 127 ML/MIN/1.73 M 2
GLOBULIN SER CALC-MCNC: 2.5 G/DL (ref 1.9–3.5)
GLUCOSE SERPL-MCNC: 101 MG/DL (ref 65–99)
HCG SERPL QL: NEGATIVE
HCT VFR BLD AUTO: 42.4 % (ref 37–47)
HGB BLD-MCNC: 14.4 G/DL (ref 12–16)
IMM GRANULOCYTES # BLD AUTO: 0.04 K/UL (ref 0–0.11)
IMM GRANULOCYTES NFR BLD AUTO: 0.8 % (ref 0–0.9)
LYMPHOCYTES # BLD AUTO: 0.68 K/UL (ref 1–4.8)
LYMPHOCYTES NFR BLD: 14.1 % (ref 22–41)
MCH RBC QN AUTO: 30.8 PG (ref 27–33)
MCHC RBC AUTO-ENTMCNC: 34 G/DL (ref 33.6–35)
MCV RBC AUTO: 90.8 FL (ref 81.4–97.8)
MONOCYTES # BLD AUTO: 0.64 K/UL (ref 0–0.85)
MONOCYTES NFR BLD AUTO: 13.3 % (ref 0–13.4)
NEUTROPHILS # BLD AUTO: 3.41 K/UL (ref 2–7.15)
NEUTROPHILS NFR BLD: 71 % (ref 44–72)
NRBC # BLD AUTO: 0 K/UL
NRBC BLD-RTO: 0 /100 WBC
PLATELET # BLD AUTO: 132 K/UL (ref 164–446)
PMV BLD AUTO: 10 FL (ref 9–12.9)
POTASSIUM SERPL-SCNC: 4.3 MMOL/L (ref 3.6–5.5)
PROT SERPL-MCNC: 7 G/DL (ref 6–8.2)
RBC # BLD AUTO: 4.67 M/UL (ref 4.2–5.4)
SODIUM SERPL-SCNC: 142 MMOL/L (ref 135–145)
WBC # BLD AUTO: 4.8 K/UL (ref 4.8–10.8)

## 2022-11-29 PROCEDURE — 84703 CHORIONIC GONADOTROPIN ASSAY: CPT

## 2022-11-29 PROCEDURE — 700111 HCHG RX REV CODE 636 W/ 250 OVERRIDE (IP)

## 2022-11-29 PROCEDURE — 99285 EMERGENCY DEPT VISIT HI MDM: CPT

## 2022-11-29 PROCEDURE — 82550 ASSAY OF CK (CPK): CPT

## 2022-11-29 PROCEDURE — 80053 COMPREHEN METABOLIC PANEL: CPT

## 2022-11-29 PROCEDURE — 85025 COMPLETE CBC W/AUTO DIFF WBC: CPT

## 2022-11-29 PROCEDURE — 83735 ASSAY OF MAGNESIUM: CPT

## 2022-11-29 PROCEDURE — 84146 ASSAY OF PROLACTIN: CPT

## 2022-11-29 PROCEDURE — 36415 COLL VENOUS BLD VENIPUNCTURE: CPT

## 2022-11-29 PROCEDURE — 84100 ASSAY OF PHOSPHORUS: CPT

## 2022-11-29 PROCEDURE — HZ2ZZZZ DETOXIFICATION SERVICES FOR SUBSTANCE ABUSE TREATMENT: ICD-10-PCS | Performed by: EMERGENCY MEDICINE

## 2022-11-29 PROCEDURE — 83690 ASSAY OF LIPASE: CPT

## 2022-11-29 PROCEDURE — 82077 ASSAY SPEC XCP UR&BREATH IA: CPT

## 2022-11-29 PROCEDURE — 94760 N-INVAS EAR/PLS OXIMETRY 1: CPT

## 2022-11-29 PROCEDURE — 96375 TX/PRO/DX INJ NEW DRUG ADDON: CPT

## 2022-11-29 RX ORDER — CHLORDIAZEPOXIDE HYDROCHLORIDE 25 MG/1
100 CAPSULE, GELATIN COATED ORAL ONCE
Status: COMPLETED | OUTPATIENT
Start: 2022-11-30 | End: 2022-11-30

## 2022-11-29 RX ORDER — LORAZEPAM 2 MG/ML
1 INJECTION INTRAMUSCULAR ONCE
Status: DISCONTINUED | OUTPATIENT
Start: 2022-11-29 | End: 2022-11-29

## 2022-11-29 RX ORDER — TRAZODONE HYDROCHLORIDE 50 MG/1
50 TABLET ORAL ONCE
Status: DISCONTINUED | OUTPATIENT
Start: 2022-11-30 | End: 2022-11-30

## 2022-11-29 RX ORDER — MIDAZOLAM HYDROCHLORIDE 1 MG/ML
2.5 INJECTION INTRAMUSCULAR; INTRAVENOUS ONCE
Status: COMPLETED | OUTPATIENT
Start: 2022-11-29 | End: 2022-11-29

## 2022-11-29 RX ADMIN — MIDAZOLAM HYDROCHLORIDE 2.5 MG: 1 INJECTION, SOLUTION INTRAMUSCULAR; INTRAVENOUS at 20:32

## 2022-11-29 ASSESSMENT — FIBROSIS 4 INDEX: FIB4 SCORE: 3.88

## 2022-11-30 ENCOUNTER — APPOINTMENT (OUTPATIENT)
Dept: RADIOLOGY | Facility: MEDICAL CENTER | Age: 27
DRG: 100 | End: 2022-11-30
Attending: INTERNAL MEDICINE
Payer: COMMERCIAL

## 2022-11-30 PROBLEM — K29.70 GASTRITIS: Status: ACTIVE | Noted: 2022-11-30

## 2022-11-30 LAB
AMPHET UR QL SCN: NEGATIVE
BARBITURATES UR QL SCN: NEGATIVE
BENZODIAZ UR QL SCN: POSITIVE
BZE UR QL SCN: NEGATIVE
CANNABINOIDS UR QL SCN: NEGATIVE
CK SERPL-CCNC: 477 U/L (ref 0–154)
EKG IMPRESSION: NORMAL
FLUAV RNA SPEC QL NAA+PROBE: NEGATIVE
FLUBV RNA SPEC QL NAA+PROBE: NEGATIVE
GLUCOSE BLD STRIP.AUTO-MCNC: 108 MG/DL (ref 65–99)
LIPASE SERPL-CCNC: 22 U/L (ref 11–82)
MAGNESIUM SERPL-MCNC: 2.2 MG/DL (ref 1.5–2.5)
METHADONE UR QL SCN: NEGATIVE
OPIATES UR QL SCN: NEGATIVE
OXYCODONE UR QL SCN: NEGATIVE
PCP UR QL SCN: NEGATIVE
PHOSPHATE SERPL-MCNC: 3 MG/DL (ref 2.5–4.5)
PROLACTIN SERPL-MCNC: 7.69 NG/ML (ref 2.8–26)
PROPOXYPH UR QL SCN: NEGATIVE
RSV RNA SPEC QL NAA+PROBE: NEGATIVE
SARS-COV-2 RNA RESP QL NAA+PROBE: DETECTED
SPECIMEN SOURCE: ABNORMAL

## 2022-11-30 PROCEDURE — 770020 HCHG ROOM/CARE - TELE (206)

## 2022-11-30 PROCEDURE — A9270 NON-COVERED ITEM OR SERVICE: HCPCS | Performed by: INTERNAL MEDICINE

## 2022-11-30 PROCEDURE — 96375 TX/PRO/DX INJ NEW DRUG ADDON: CPT

## 2022-11-30 PROCEDURE — A9270 NON-COVERED ITEM OR SERVICE: HCPCS | Performed by: EMERGENCY MEDICINE

## 2022-11-30 PROCEDURE — 700101 HCHG RX REV CODE 250: Performed by: EMERGENCY MEDICINE

## 2022-11-30 PROCEDURE — 93005 ELECTROCARDIOGRAM TRACING: CPT | Performed by: INTERNAL MEDICINE

## 2022-11-30 PROCEDURE — 82962 GLUCOSE BLOOD TEST: CPT

## 2022-11-30 PROCEDURE — 700111 HCHG RX REV CODE 636 W/ 250 OVERRIDE (IP): Performed by: INTERNAL MEDICINE

## 2022-11-30 PROCEDURE — 700102 HCHG RX REV CODE 250 W/ 637 OVERRIDE(OP): Performed by: EMERGENCY MEDICINE

## 2022-11-30 PROCEDURE — 0241U HCHG SARS-COV-2 COVID-19 NFCT DS RESP RNA 4 TRGT MIC: CPT

## 2022-11-30 PROCEDURE — 4A10X4Z MONITORING OF CENTRAL NERVOUS ELECTRICAL ACTIVITY, EXTERNAL APPROACH: ICD-10-PCS | Performed by: STUDENT IN AN ORGANIZED HEALTH CARE EDUCATION/TRAINING PROGRAM

## 2022-11-30 PROCEDURE — 80307 DRUG TEST PRSMV CHEM ANLYZR: CPT

## 2022-11-30 PROCEDURE — 70551 MRI BRAIN STEM W/O DYE: CPT

## 2022-11-30 PROCEDURE — C9803 HOPD COVID-19 SPEC COLLECT: HCPCS | Performed by: INTERNAL MEDICINE

## 2022-11-30 PROCEDURE — 96366 THER/PROPH/DIAG IV INF ADDON: CPT

## 2022-11-30 PROCEDURE — 36415 COLL VENOUS BLD VENIPUNCTURE: CPT

## 2022-11-30 PROCEDURE — 99223 1ST HOSP IP/OBS HIGH 75: CPT | Mod: AI | Performed by: INTERNAL MEDICINE

## 2022-11-30 PROCEDURE — 700102 HCHG RX REV CODE 250 W/ 637 OVERRIDE(OP): Performed by: INTERNAL MEDICINE

## 2022-11-30 PROCEDURE — 96365 THER/PROPH/DIAG IV INF INIT: CPT

## 2022-11-30 PROCEDURE — 96376 TX/PRO/DX INJ SAME DRUG ADON: CPT

## 2022-11-30 PROCEDURE — 95816 EEG AWAKE AND DROWSY: CPT | Performed by: STUDENT IN AN ORGANIZED HEALTH CARE EDUCATION/TRAINING PROGRAM

## 2022-11-30 PROCEDURE — 95816 EEG AWAKE AND DROWSY: CPT | Mod: 26 | Performed by: STUDENT IN AN ORGANIZED HEALTH CARE EDUCATION/TRAINING PROGRAM

## 2022-11-30 RX ORDER — GUAIFENESIN/DEXTROMETHORPHAN 100-10MG/5
10 SYRUP ORAL EVERY 6 HOURS PRN
Status: DISCONTINUED | OUTPATIENT
Start: 2022-11-30 | End: 2022-12-02 | Stop reason: HOSPADM

## 2022-11-30 RX ORDER — POLYETHYLENE GLYCOL 3350 17 G/17G
1 POWDER, FOR SOLUTION ORAL
Status: DISCONTINUED | OUTPATIENT
Start: 2022-11-30 | End: 2022-12-02 | Stop reason: HOSPADM

## 2022-11-30 RX ORDER — PROMETHAZINE HYDROCHLORIDE 25 MG/1
12.5-25 TABLET ORAL EVERY 4 HOURS PRN
Status: DISCONTINUED | OUTPATIENT
Start: 2022-11-30 | End: 2022-12-02 | Stop reason: HOSPADM

## 2022-11-30 RX ORDER — PROCHLORPERAZINE EDISYLATE 5 MG/ML
5-10 INJECTION INTRAMUSCULAR; INTRAVENOUS EVERY 4 HOURS PRN
Status: DISCONTINUED | OUTPATIENT
Start: 2022-11-30 | End: 2022-12-02 | Stop reason: HOSPADM

## 2022-11-30 RX ORDER — MORPHINE SULFATE 4 MG/ML
4 INJECTION INTRAVENOUS
Status: DISCONTINUED | OUTPATIENT
Start: 2022-11-30 | End: 2022-11-30

## 2022-11-30 RX ORDER — DIAZEPAM 2 MG/1
2 TABLET ORAL EVERY 6 HOURS PRN
Status: DISCONTINUED | OUTPATIENT
Start: 2022-11-30 | End: 2022-12-02 | Stop reason: HOSPADM

## 2022-11-30 RX ORDER — GAUZE BANDAGE 2" X 2"
200 BANDAGE TOPICAL DAILY
Status: DISCONTINUED | OUTPATIENT
Start: 2022-11-30 | End: 2022-12-02 | Stop reason: HOSPADM

## 2022-11-30 RX ORDER — GABAPENTIN 100 MG/1
200 CAPSULE ORAL 3 TIMES DAILY
Status: DISCONTINUED | OUTPATIENT
Start: 2022-11-30 | End: 2022-12-02 | Stop reason: HOSPADM

## 2022-11-30 RX ORDER — ONDANSETRON 4 MG/1
4 TABLET, ORALLY DISINTEGRATING ORAL EVERY 4 HOURS PRN
Status: DISCONTINUED | OUTPATIENT
Start: 2022-11-30 | End: 2022-12-02 | Stop reason: HOSPADM

## 2022-11-30 RX ORDER — ONDANSETRON 2 MG/ML
4 INJECTION INTRAMUSCULAR; INTRAVENOUS EVERY 4 HOURS PRN
Status: DISCONTINUED | OUTPATIENT
Start: 2022-11-30 | End: 2022-12-02 | Stop reason: HOSPADM

## 2022-11-30 RX ORDER — ZOLPIDEM TARTRATE 5 MG/1
5 TABLET ORAL ONCE
Status: COMPLETED | OUTPATIENT
Start: 2022-11-30 | End: 2022-11-30

## 2022-11-30 RX ORDER — SUCRALFATE 1 G/1
1 TABLET ORAL EVERY 6 HOURS
Status: DISCONTINUED | OUTPATIENT
Start: 2022-11-30 | End: 2022-12-02 | Stop reason: HOSPADM

## 2022-11-30 RX ORDER — OXYCODONE HYDROCHLORIDE 10 MG/1
10 TABLET ORAL
Status: DISCONTINUED | OUTPATIENT
Start: 2022-11-30 | End: 2022-12-02 | Stop reason: HOSPADM

## 2022-11-30 RX ORDER — FAMOTIDINE 20 MG/1
20 TABLET, FILM COATED ORAL 2 TIMES DAILY
Status: DISCONTINUED | OUTPATIENT
Start: 2022-11-30 | End: 2022-12-02 | Stop reason: HOSPADM

## 2022-11-30 RX ORDER — ACETAMINOPHEN 325 MG/1
650 TABLET ORAL EVERY 6 HOURS PRN
Status: DISCONTINUED | OUTPATIENT
Start: 2022-11-30 | End: 2022-12-02 | Stop reason: HOSPADM

## 2022-11-30 RX ORDER — QUETIAPINE FUMARATE 25 MG/1
25 TABLET, FILM COATED ORAL 2 TIMES DAILY PRN
Status: DISCONTINUED | OUTPATIENT
Start: 2022-11-30 | End: 2022-12-02 | Stop reason: HOSPADM

## 2022-11-30 RX ORDER — MIDAZOLAM HYDROCHLORIDE 1 MG/ML
2 INJECTION INTRAMUSCULAR; INTRAVENOUS
Status: DISCONTINUED | OUTPATIENT
Start: 2022-11-30 | End: 2022-12-02 | Stop reason: HOSPADM

## 2022-11-30 RX ORDER — PROMETHAZINE HYDROCHLORIDE 25 MG/1
12.5-25 SUPPOSITORY RECTAL EVERY 4 HOURS PRN
Status: DISCONTINUED | OUTPATIENT
Start: 2022-11-30 | End: 2022-12-02 | Stop reason: HOSPADM

## 2022-11-30 RX ORDER — BISACODYL 10 MG
10 SUPPOSITORY, RECTAL RECTAL
Status: DISCONTINUED | OUTPATIENT
Start: 2022-11-30 | End: 2022-12-02 | Stop reason: HOSPADM

## 2022-11-30 RX ORDER — OXYCODONE HYDROCHLORIDE 5 MG/1
5 TABLET ORAL
Status: DISCONTINUED | OUTPATIENT
Start: 2022-11-30 | End: 2022-12-02 | Stop reason: HOSPADM

## 2022-11-30 RX ORDER — HYDROXYZINE HYDROCHLORIDE 10 MG/1
10 TABLET, FILM COATED ORAL 3 TIMES DAILY PRN
Status: DISCONTINUED | OUTPATIENT
Start: 2022-11-30 | End: 2022-12-02 | Stop reason: HOSPADM

## 2022-11-30 RX ORDER — NICOTINE 21 MG/24HR
21 PATCH, TRANSDERMAL 24 HOURS TRANSDERMAL
Status: DISCONTINUED | OUTPATIENT
Start: 2022-11-30 | End: 2022-12-02 | Stop reason: HOSPADM

## 2022-11-30 RX ORDER — ZOLPIDEM TARTRATE 5 MG/1
10 TABLET ORAL NIGHTLY PRN
Status: DISCONTINUED | OUTPATIENT
Start: 2022-11-30 | End: 2022-12-01

## 2022-11-30 RX ORDER — AMOXICILLIN 250 MG
2 CAPSULE ORAL 2 TIMES DAILY
Status: DISCONTINUED | OUTPATIENT
Start: 2022-11-30 | End: 2022-12-02 | Stop reason: HOSPADM

## 2022-11-30 RX ORDER — DIPHENHYDRAMINE HCL 25 MG
25 TABLET ORAL EVERY 6 HOURS PRN
Status: DISCONTINUED | OUTPATIENT
Start: 2022-11-30 | End: 2022-12-02 | Stop reason: HOSPADM

## 2022-11-30 RX ADMIN — GABAPENTIN 200 MG: 100 CAPSULE ORAL at 11:40

## 2022-11-30 RX ADMIN — SUCRALFATE 1 G: 1 TABLET ORAL at 08:33

## 2022-11-30 RX ADMIN — FAMOTIDINE 20 MG: 20 TABLET, FILM COATED ORAL at 17:34

## 2022-11-30 RX ADMIN — THIAMINE HYDROCHLORIDE: 100 INJECTION, SOLUTION INTRAMUSCULAR; INTRAVENOUS at 00:14

## 2022-11-30 RX ADMIN — SUCRALFATE 1 G: 1 TABLET ORAL at 17:34

## 2022-11-30 RX ADMIN — NICOTINE POLACRILEX 2 MG: 2 GUM, CHEWING BUCCAL at 21:40

## 2022-11-30 RX ADMIN — NICOTINE TRANSDERMAL SYSTEM 21 MG: 21 PATCH, EXTENDED RELEASE TRANSDERMAL at 11:40

## 2022-11-30 RX ADMIN — VALPROIC ACID 250 MG: 250 SOLUTION ORAL at 08:34

## 2022-11-30 RX ADMIN — ONDANSETRON 4 MG: 2 INJECTION INTRAMUSCULAR; INTRAVENOUS at 08:33

## 2022-11-30 RX ADMIN — SUCRALFATE 1 G: 1 TABLET ORAL at 01:32

## 2022-11-30 RX ADMIN — ZOLPIDEM TARTRATE 5 MG: 5 TABLET ORAL at 00:18

## 2022-11-30 RX ADMIN — SUCRALFATE 1 G: 1 TABLET ORAL at 11:40

## 2022-11-30 RX ADMIN — MIDAZOLAM HYDROCHLORIDE 2 MG: 1 INJECTION, SOLUTION INTRAMUSCULAR; INTRAVENOUS at 12:55

## 2022-11-30 RX ADMIN — GABAPENTIN 200 MG: 100 CAPSULE ORAL at 01:32

## 2022-11-30 RX ADMIN — VALPROIC ACID 250 MG: 250 SOLUTION ORAL at 17:34

## 2022-11-30 RX ADMIN — GABAPENTIN 200 MG: 100 CAPSULE ORAL at 17:34

## 2022-11-30 RX ADMIN — FAMOTIDINE 20 MG: 10 INJECTION, SOLUTION INTRAVENOUS at 06:26

## 2022-11-30 RX ADMIN — CHLORDIAZEPOXIDE HYDROCHLORIDE 100 MG: 25 CAPSULE ORAL at 00:21

## 2022-11-30 RX ADMIN — THIAMINE HCL TAB 100 MG 200 MG: 100 TAB at 01:32

## 2022-11-30 RX ADMIN — SUCRALFATE 1 G: 1 TABLET ORAL at 23:08

## 2022-11-30 RX ADMIN — DIPHENHYDRAMINE HYDROCHLORIDE 25 MG: 25 TABLET ORAL at 23:12

## 2022-11-30 RX ADMIN — HYDROXYZINE HYDROCHLORIDE 10 MG: 10 TABLET ORAL at 19:54

## 2022-11-30 RX ADMIN — GABAPENTIN 200 MG: 100 CAPSULE ORAL at 06:26

## 2022-11-30 ASSESSMENT — LIFESTYLE VARIABLES
ANXIETY: MILDLY ANXIOUS
PAROXYSMAL SWEATS: *
NAUSEA AND VOMITING: INTERMITTENT NAUSEA WITH DRY HEAVES
AUDITORY DISTURBANCES: NOT PRESENT
CONSUMPTION TOTAL: POSITIVE
HOW MANY TIMES IN THE PAST YEAR HAVE YOU HAD 5 OR MORE DRINKS IN A DAY: 80
ORIENTATION AND CLOUDING OF SENSORIUM: ORIENTED AND CAN DO SERIAL ADDITIONS
EVER FELT BAD OR GUILTY ABOUT YOUR DRINKING: YES
TOTAL SCORE: 11
PAROXYSMAL SWEATS: NO SWEAT VISIBLE
TREMOR: *
TOTAL SCORE: 4
ORIENTATION AND CLOUDING OF SENSORIUM: ORIENTED AND CAN DO SERIAL ADDITIONS
AGITATION: *
HEADACHE, FULLNESS IN HEAD: NOT PRESENT
PAROXYSMAL SWEATS: *
VISUAL DISTURBANCES: NOT PRESENT
AVERAGE NUMBER OF DAYS PER WEEK YOU HAVE A DRINK CONTAINING ALCOHOL: 7
ORIENTATION AND CLOUDING OF SENSORIUM: ORIENTED AND CAN DO SERIAL ADDITIONS
DOES PATIENT WANT TO TALK TO SOMEONE ABOUT QUITTING: NO
HEADACHE, FULLNESS IN HEAD: MODERATE
HEADACHE, FULLNESS IN HEAD: VERY MILD
NAUSEA AND VOMITING: NO NAUSEA AND NO VOMITING
HEADACHE, FULLNESS IN HEAD: MODERATELY SEVERE
NAUSEA AND VOMITING: NO NAUSEA AND NO VOMITING
ANXIETY: *
VISUAL DISTURBANCES: NOT PRESENT
HAVE YOU EVER FELT YOU SHOULD CUT DOWN ON YOUR DRINKING: YES
AGITATION: NORMAL ACTIVITY
NAUSEA AND VOMITING: NO NAUSEA AND NO VOMITING
HEADACHE, FULLNESS IN HEAD: NOT PRESENT
TOTAL SCORE: 8
AUDITORY DISTURBANCES: NOT PRESENT
ORIENTATION AND CLOUDING OF SENSORIUM: ORIENTED AND CAN DO SERIAL ADDITIONS
DOES PATIENT WANT TO STOP DRINKING: YES
AGITATION: NORMAL ACTIVITY
ON A TYPICAL DAY WHEN YOU DRINK ALCOHOL HOW MANY DRINKS DO YOU HAVE: 3
TREMOR: MODERATE TREMOR WITH ARMS EXTENDED
PAROXYSMAL SWEATS: NO SWEAT VISIBLE
VISUAL DISTURBANCES: NOT PRESENT
AGITATION: SOMEWHAT MORE THAN NORMAL ACTIVITY
EVER HAD A DRINK FIRST THING IN THE MORNING TO STEADY YOUR NERVES TO GET RID OF A HANGOVER: YES
TREMOR: *
SUBSTANCE_ABUSE: 1
TREMOR: *
TOTAL SCORE: 13
TOTAL SCORE: 4
AGITATION: SOMEWHAT MORE THAN NORMAL ACTIVITY
TREMOR: *
AUDITORY DISTURBANCES: NOT PRESENT
TOTAL SCORE: 4
AUDITORY DISTURBANCES: NOT PRESENT
HAVE PEOPLE ANNOYED YOU BY CRITICIZING YOUR DRINKING: YES
AUDITORY DISTURBANCES: VERY MILD HARSHNESS OR ABILITY TO FRIGHTEN
ALCOHOL_USE: YES
TOTAL SCORE: 5
ORIENTATION AND CLOUDING OF SENSORIUM: CANNOT DO SERIAL ADDITIONS OR IS UNCERTAIN ABOUT DATE
PAROXYSMAL SWEATS: NO SWEAT VISIBLE
SUBSTANCE_ABUSE: 0
VISUAL DISTURBANCES: NOT PRESENT
TACTILE DISTURBANCES: MILD ITCHING, PINS AND NEEDLES SENSATION, BURNING OR NUMBNESS
NAUSEA AND VOMITING: NO NAUSEA AND NO VOMITING
ANXIETY: MODERATELY ANXIOUS OR GUARDED, SO ANXIETY IS INFERRED
VISUAL DISTURBANCES: NOT PRESENT
ANXIETY: MILDLY ANXIOUS
TOTAL SCORE: 11
ANXIETY: NO ANXIETY (AT EASE)

## 2022-11-30 ASSESSMENT — ENCOUNTER SYMPTOMS
TREMORS: 0
CONSTIPATION: 0
PALPITATIONS: 0
FLANK PAIN: 0
HEARTBURN: 0
NERVOUS/ANXIOUS: 0
SPEECH CHANGE: 0
SPUTUM PRODUCTION: 0
ABDOMINAL PAIN: 1
VOMITING: 0
HALLUCINATIONS: 0
SEIZURES: 1
NECK PAIN: 0
ORTHOPNEA: 0
DIARRHEA: 0
FEVER: 0
CHILLS: 0
BRUISES/BLEEDS EASILY: 0
WEIGHT LOSS: 0
HEMOPTYSIS: 0
POLYDIPSIA: 0
BACK PAIN: 0
HEADACHES: 1
NAUSEA: 1
FOCAL WEAKNESS: 0
COUGH: 0
DOUBLE VISION: 0
PHOTOPHOBIA: 0
BLURRED VISION: 0
NERVOUS/ANXIOUS: 1

## 2022-11-30 ASSESSMENT — FIBROSIS 4 INDEX: FIB4 SCORE: 1.97

## 2022-11-30 NOTE — ED TRIAGE NOTES
"Chief Complaint   Patient presents with   • Seizure     Pt's boyfriend witnessed pt having a grand mal seizure about 1 hour PTA that lasted approximately 5 minutes.  Pt reports history but takes no medications and has not seen a neurologist.        BIB EMS to [facility], pt on monitor and in gown, labs drawn and sent. Pt reports hx of seizures, and experienced a 5 minute witnessed grand mal seizure by her boyfriend about an hr PTA. Pt is also reporting cold symptoms and a cough recently.  Pt arrives [facility].    Medications given en route:3 mg versed, 4 mg zofran, IVF    BP (!) 133/93   Pulse (!) 124   Temp 36.7 °C (98 °F) (Temporal)   Resp 16   Ht 1.651 m (5' 5\")   Wt 68 kg (150 lb)   SpO2 93%   BMI 24.96 kg/m²    "

## 2022-11-30 NOTE — ED PROVIDER NOTES
ED Provider Note     Scribed for Muna Butler D.O. by Travis Rivas. 11/29/2022, 8:01 PM.     Primary care provider: FATOUMATA Su  Means of arrival: EMS         History obtained from: Patient  History limited by: None    CHIEF COMPLAINT  Chief Complaint   Patient presents with    Seizure     Pt's boyfriend witnessed pt having a grand mal seizure about 1 hour PTA that lasted approximately 5 minutes.  Pt reports history but takes no medications and has not seen a neurologist.        HPI  Mariia Samayoa is a 26 y.o. female who presents to the emergency Department for seizure onset 1 hour ago prior to arrival. Patient has history of 5 seizures in the last year and a half but states she does not have medication for it. She denies seeing neurology and only her PCP when episodes occur. Patients boyfriend witnessed the seizure episode and states it lasted around 5 minutes and was worse than previous seizures witnessed. Patient states she was laying on the couch when the seizure happened and denies fall, trauma, or oral injury. Patient drinks 2-3 drinks a day and occasionally more on the weekends for a long time. She states she drank 1 shot of alcohol this morning with that being her last drink today. Patient notes she has not received help for alcohol abuse but has been researching for resources. Patient denies history of diabetes but is unsure. States there is a possibility for pregnancy. Patient states her last time medicating with Ativan she had a adverse mental state and became slightly aggressive.     REVIEW OF SYSTEMS  Pertinent positives include seizure. Pertinent negatives include no trauma, fall, or tongue bite.   See HPI for further details. All other systems are negative.    PAST MEDICAL HISTORY  Past Medical History:   Diagnosis Date    Anxiety     ASTHMA     exercise induced    Depression     EtOH dependence (HCC)        FAMILY HISTORY  Family History   Problem Relation Age of Onset    Cancer  Mother     Hypertension Maternal Grandmother        SOCIAL HISTORY  Social History     Tobacco Use    Smoking status: Former    Smokeless tobacco: Never   Vaping Use    Vaping Use: Every day   Substance Use Topics    Alcohol use: Yes     Alcohol/week: 6.0 oz     Types: 5 Cans of beer, 5 Shots of liquor per week     Comment: Binge    Drug use: Not Currently      Social History     Substance and Sexual Activity   Drug Use Not Currently       SURGICAL HISTORY  History reviewed. No pertinent surgical history.    CURRENT MEDICATIONS    Current Facility-Administered Medications:     senna-docusate (PERICOLACE or SENOKOT S) 8.6-50 MG per tablet 2 Tablet, 2 Tablet, Oral, BID **AND** polyethylene glycol/lytes (MIRALAX) PACKET 1 Packet, 1 Packet, Oral, QDAY PRN **AND** magnesium hydroxide (MILK OF MAGNESIA) suspension 30 mL, 30 mL, Oral, QDAY PRN **AND** bisacodyl (DULCOLAX) suppository 10 mg, 10 mg, Rectal, QDAY PRN, Juanito Hammer M.D.    gabapentin (NEURONTIN) capsule 200 mg, 200 mg, Oral, TID, Juanito Hammer M.D.    valproate Sodium (Depakene) oral solution 250 mg, 250 mg, Oral, BID, Juanito Hammer M.D.    famotidine (PEPCID) injection 20 mg, 20 mg, Intravenous, BID, Juanito Hammer M.D.    sucralfate (CARAFATE) tablet 1 g, 1 g, Oral, Q6HRS, Juanito Hammer M.D.    acetaminophen (Tylenol) tablet 650 mg, 650 mg, Oral, Q6HRS PRN, Juanito Hammer M.D.    Notify provider if pain remains uncontrolled, , , CONTINUOUS **AND** Use the Numeric Rating Scale (NRS), Duenas-Baker Faces (WBF), or FLACC on regular floors and Critical-Care Pain Observation Tool (CPOT) on ICUs/Trauma to assess pain, , , CONTINUOUS **AND** Pulse Ox, , , CONTINUOUS **AND** Pharmacy Consult Request ...Pain Management Review 1 Each, 1 Each, Other, PHARMACY TO DOSE **AND** If patient difficult to arouse and/or has respiratory depression (respiratory rate of 10 or less), stop any opiates that are currently infusing and call a Rapid Response., , ,  "CONTINUOUS, Juanito Hammer M.D.    oxyCODONE immediate-release (ROXICODONE) tablet 5 mg, 5 mg, Oral, Q3HRS PRN **OR** oxyCODONE immediate-release (ROXICODONE) tablet 10 mg, 10 mg, Oral, Q3HRS PRN **OR** morphine 4 MG/ML injection 4 mg, 4 mg, Intravenous, Q3HRS PRN, Juanito Hammer M.D.    ondansetron (ZOFRAN) syringe/vial injection 4 mg, 4 mg, Intravenous, Q4HRS PRN, Juanito Hammer M.D.    ondansetron (ZOFRAN ODT) dispertab 4 mg, 4 mg, Oral, Q4HRS PRN, Juanito Hammer M.D.    promethazine (PHENERGAN) tablet 12.5-25 mg, 12.5-25 mg, Oral, Q4HRS PRN, Juanito Hammer M.D.    promethazine (PHENERGAN) suppository 12.5-25 mg, 12.5-25 mg, Rectal, Q4HRS PRN, Juanito Hammer M.D.    prochlorperazine (COMPAZINE) injection 5-10 mg, 5-10 mg, Intravenous, Q4HRS PRN, Juanito Hammer M.D.    guaiFENesin dextromethorphan (ROBITUSSIN DM) 100-10 MG/5ML syrup 10 mL, 10 mL, Oral, Q6HRS PRN, Juanito Hammer M.D.    Notify MD and PharmD if FSBG level is less than or equal to 70 mg/dL or patient is showing signs/symptoms of hypoglycemia (tachycardia, palpitations, diaphoresis, clammy, tremulousness, nausea, confused), , , Once **AND** Administer 20 grams of glucose (approximately 8 ounces of fruit juice) every 15 minutes PRN FSBS less than 70 mg/dL, , , PRN **AND** dextrose 10 % BOLUS 25 g, 25 g, Intravenous, Q15 MIN PRN, Juanito Hammer M.D.    thiamine (Vitamin B-1) tablet 200 mg, 200 mg, Oral, DAILY, Juanito Hammer M.D.    midazolam (Versed) injection 2 mg, 2 mg, Intravenous, Q HOUR PRN, Juanito Hammer M.D.    chlordiazePOXIDE (Librium) capsule 100 mg, 100 mg, Oral, Once, Muna Butler D.O.  No current outpatient medications on file.    ALLERGIES  Allergies   Allergen Reactions    Pcn [Penicillins] Unspecified     Childhood  Unsure of reaction       PHYSICAL EXAM  VITAL SIGNS: BP (!) 133/93   Pulse (!) 124   Temp 36.7 °C (98 °F) (Temporal)   Resp 16   Ht 1.651 m (5' 5\")   Wt 68 kg (150 lb)   SpO2 93%   " BMI 24.96 kg/m²     Constitutional: Patient is a pale female in moderate distress.  HENT: Normocephalic, atraumatic. No oral trauma,  Nose normal with no mucosal edema or drainage. Oropharynx moist without erythema or exudates.  Eyes: PERRL, EOMI, Conjunctiva without erythema   Neck: Supple with no tenderness, normal range of motion.  Lymphatic: No lymphadenopathy noted.   Cardiovascular: Normal heart rate and Regular rhythm. No murmur  Thorax & Lungs: Clear and equal breath sounds with good excursion. No respiratory distress, no rhonchi, wheezing.  Abdomen: Bowel sounds normal in all four quadrants. Soft,nontender, no rebound , guarding, palpable masses.   Skin: Warm, Dry  Back: No cervical, thoracic, or lumbosacral tenderness.  Extremities: Peripheral pulses 4/4 No edema, No tenderness, tremulous.  Musculoskeletal: Normal range of motion in all major joints. No tenderness to palpation or major deformities noted.   Neurologic: Alert & oriented x 3, Normal motor function, Normal sensory function, No lateralizing or focal deficits noted. DTR's 4/4 bilaterally.  Psychiatric: Affect agitated, anxious      DIAGNOSTICS/PROCEDURES    LABS  Results for orders placed or performed during the hospital encounter of 11/29/22   CBC WITH DIFFERENTIAL   Result Value Ref Range    WBC 4.8 4.8 - 10.8 K/uL    RBC 4.67 4.20 - 5.40 M/uL    Hemoglobin 14.4 12.0 - 16.0 g/dL    Hematocrit 42.4 37.0 - 47.0 %    MCV 90.8 81.4 - 97.8 fL    MCH 30.8 27.0 - 33.0 pg    MCHC 34.0 33.6 - 35.0 g/dL    RDW 44.0 35.9 - 50.0 fL    Platelet Count 132 (L) 164 - 446 K/uL    MPV 10.0 9.0 - 12.9 fL    Neutrophils-Polys 71.00 44.00 - 72.00 %    Lymphocytes 14.10 (L) 22.00 - 41.00 %    Monocytes 13.30 0.00 - 13.40 %    Eosinophils 0.40 0.00 - 6.90 %    Basophils 0.40 0.00 - 1.80 %    Immature Granulocytes 0.80 0.00 - 0.90 %    Nucleated RBC 0.00 /100 WBC    Neutrophils (Absolute) 3.41 2.00 - 7.15 K/uL    Lymphs (Absolute) 0.68 (L) 1.00 - 4.80 K/uL    Monos  (Absolute) 0.64 0.00 - 0.85 K/uL    Eos (Absolute) 0.02 0.00 - 0.51 K/uL    Baso (Absolute) 0.02 0.00 - 0.12 K/uL    Immature Granulocytes (abs) 0.04 0.00 - 0.11 K/uL    NRBC (Absolute) 0.00 K/uL   COMP METABOLIC PANEL   Result Value Ref Range    Sodium 142 135 - 145 mmol/L    Potassium 4.3 3.6 - 5.5 mmol/L    Chloride 103 96 - 112 mmol/L    Co2 28 20 - 33 mmol/L    Anion Gap 11.0 7.0 - 16.0    Glucose 101 (H) 65 - 99 mg/dL    Bun 8 8 - 22 mg/dL    Creatinine 0.56 0.50 - 1.40 mg/dL    Calcium 9.5 8.5 - 10.5 mg/dL    AST(SGOT) 60 (H) 12 - 45 U/L    ALT(SGPT) 39 2 - 50 U/L    Alkaline Phosphatase 126 (H) 30 - 99 U/L    Total Bilirubin 0.3 0.1 - 1.5 mg/dL    Albumin 4.5 3.2 - 4.9 g/dL    Total Protein 7.0 6.0 - 8.2 g/dL    Globulin 2.5 1.9 - 3.5 g/dL    A-G Ratio 1.8 g/dL   HCG QUAL SERUM   Result Value Ref Range    Beta-Hcg Qualitative Serum Negative Negative   ESTIMATED GFR   Result Value Ref Range    GFR (CKD-EPI) 127 >60 mL/min/1.73 m 2   DIAGNOSTIC ALCOHOL   Result Value Ref Range    Diagnostic Alcohol <10.1 <10.1 mg/dL       Labs reviewed by me    COURSE & MEDICAL DECISION MAKING  Pertinent Labs & Imaging studies reviewed. (See chart for details)    8:01 PM - Patient seen and evaluated at bedside. Ordered for CBC w/diff, CMP, and Hcg qual to evaluate. Patient will be treated with Versed 2.5 mg injection for her symptoms. I informed the patient the first step in treating this is to find resources for alcohol abuse, I referred her to behavioral health for some resources. Differential diagnoses include, but are not limited to, alcohol induced seizures vs epilepsy    11:11 PM - Patient was reevaluated at bedside. Patient reported feeling shaky due to not eating. She is currently eating. I informed her of the choice to either admit the patient for an EEG or be discharged home. I informed her this is most likely alcohol induced and I am worried if she will stay in the hospital if we admit her. Patient states she wants  to go home.  The patient states she wants to stay with encouragement from her boyfriend. Patient verbalizes understanding and agreement to this plan of care.     11:27 PM - Paged Hospitalist.    11:30 PM Patient will be treated with Ambien for sleep,Librium 100 mg capsule, and Detox IV.     11:50 PM I discussed the patient's case and the above findings with Dr. Hammer (Hospitalist) who will assess the patient for hospitalization.       DISPOSITION:  Patient will be hospitalized by Dr. Trent in guarded condition.      FINAL IMPRESSION  1. Seizure (HCC)    2. Alcohol abuse    3. Alcohol withdrawal syndrome without complication (HCC)         Travis GOMEZ (Scribe), am scribing for, and in the presence of, Muna Butler D.O..    Electronically signed by: Travis Rivas ([email or web address]), 11/29/2022    IMuna D.O. personally performed the services described in this documentation, as scribed by Travis Rivas in my presence, and it is both accurate and complete.    The note accurately reflects work and decisions made by me.  Muna Butler D.O.  11/30/2022  1:32 AM

## 2022-11-30 NOTE — ASSESSMENT & PLAN NOTE
- Monitor for withdrawal.  Last drink was in a.m of admission on 11/30.  - Likely alcohol withdrawal seizure earlier today  - Will order gabapentin and Depakote as an alternative alcohol withdrawal protocol due to report of reaction to Ativan (agitation with aggression)  - Thiamine supplementation  - Fall, aspiration, seizure precautions

## 2022-11-30 NOTE — ED NOTES
Report from Pricila PRESLEY. Pt appears to be sleeping in bed, even chest rise and fall, NAD. Pt has no additional requests at this time.

## 2022-11-30 NOTE — ED NOTES
Pt appears to be sleeping in bed, even chest rise and fall, NAD. Pt has no additional requests at this time.

## 2022-11-30 NOTE — PROGRESS NOTES
Hospital Medicine Daily Progress Note    Date of Service  11/30/2022    Chief Complaint  Mariia Samayoa is a 26 y.o. female admitted 11/29/2022 with seizure    Hospital Course  27 yo woman with alcohol dependence, asthma, depression, history of seizure who presented with seizure. She is positive for COVID.    Interval Problem Update  Admitted past midnight  MRI, EEG were ok  She says she's had 5 seizures in the past 2 years, never had workup for this. But she does drink alcohol around the time of the seizures.   I discussed briefly with Dr. Barrera with neuro. She needs to stop drinking but can consider keppra and neurology follow up.   Patient wants to avoid medications and wants to quit drinking. She's had severe withdrawal in the past needing hospitalization. She also has agitation and confusion with ativan. She is feeling anxious currently.  I will continue her on pb and valproate for withdrawal and add PRNs for anxiety and sleep.  She has no cough or SOB from the COVID    I have discussed this patient's plan of care and discharge plan at IDT rounds today with Case Management, Nursing, Nursing leadership, and other members of the IDT team.    Consultants/Specialty  none    Code Status  Full Code    Disposition  Patient is not medically cleared for discharge.   Anticipate discharge to to home with close outpatient follow-up.  I have placed the appropriate orders for post-discharge needs.    Review of Systems  Review of Systems   Psychiatric/Behavioral:  Positive for substance abuse. The patient is nervous/anxious.       Physical Exam  Temp:  [36.7 °C (98 °F)] 36.7 °C (98 °F)  Pulse:  [] 95  Resp:  [13-20] 17  BP: (115-141)/(73-97) 134/80  SpO2:  [90 %-97 %] 90 %    Physical Exam  Vitals and nursing note reviewed.   Constitutional:       General: She is not in acute distress.     Appearance: She is not toxic-appearing.   HENT:      Head: Normocephalic.      Mouth/Throat:      Mouth: Mucous membranes are moist.    Eyes:      General:         Right eye: No discharge.         Left eye: No discharge.   Cardiovascular:      Rate and Rhythm: Normal rate and regular rhythm.   Pulmonary:      Effort: Pulmonary effort is normal. No respiratory distress.      Breath sounds: No wheezing or rales.   Abdominal:      Palpations: Abdomen is soft.      Tenderness: There is no abdominal tenderness.   Musculoskeletal:         General: No swelling.      Cervical back: Neck supple.   Skin:     General: Skin is warm and dry.   Neurological:      Mental Status: She is alert and oriented to person, place, and time.       Fluids    Intake/Output Summary (Last 24 hours) at 11/30/2022 1438  Last data filed at 11/30/2022 0237  Gross per 24 hour   Intake 1000 ml   Output --   Net 1000 ml       Laboratory  Recent Labs     11/29/22 1956   WBC 4.8   RBC 4.67   HEMOGLOBIN 14.4   HEMATOCRIT 42.4   MCV 90.8   MCH 30.8   MCHC 34.0   RDW 44.0   PLATELETCT 132*   MPV 10.0     Recent Labs     11/29/22 1956   SODIUM 142   POTASSIUM 4.3   CHLORIDE 103   CO2 28   GLUCOSE 101*   BUN 8   CREATININE 0.56   CALCIUM 9.5                   Imaging  MR-BRAIN-W/O   Final Result      1. MRI of the brain without contrast within normal limits except for a small punctate area of T2 hyperintensity in the left parietal white matter likely representing nonspecific foci of gliosis..      2. No evidence of mass lesion, heterotopic gray matter, gross cortical dysplasia, or mesial temporal sclerosis.           Assessment/Plan  * Seizure (HCC)- (present on admission)  Assessment & Plan  Recurrent seizure in the setting of continuous alcohol abuse  In order to decide on need for antiseizure medication, ordered MRI of the brain and EEG and will consider neurology consult tomorrow  Thiamine supplementation  Versed as needed for seizure  Seizure precautions      Gastritis  Assessment & Plan  Lipase pending  Abdomen is benign on exam  Antiacids  Clear liquid diet as tolerated    Alcohol  abuse- (present on admission)  Assessment & Plan  Monitor for withdrawal.  Last drink was in a.m.   Likely alcohol withdrawal seizure earlier today  Will order gabapentin and Depakote as an alternative alcohol withdrawal protocol, due to report of reaction to Ativan (agitation with aggression)  Thiamine supplementation  Fall, aspiration, seizure precautions    Mild intermittent asthma without complication- (present on admission)  Assessment & Plan  Not in exacerbation  Monitor       VTE prophylaxis: SCDs/TEDs    I have performed a physical exam and reviewed and updated ROS and Plan today (11/30/2022). In review of yesterday's note (11/29/2022), there are no changes except as documented above.

## 2022-11-30 NOTE — H&P
Hospital Medicine History & Physical Note    Date of Service  11/30/2022    Primary Care Physician  MILE Su.        Code Status  Full Code    Chief Complaint  Chief Complaint   Patient presents with    Seizure     Pt's boyfriend witnessed pt having a grand mal seizure about 1 hour PTA that lasted approximately 5 minutes.  Pt reports history but takes no medications and has not seen a neurologist.        History of Presenting Illness  Mariia Samayoa is a 26 y.o. female with past medical history of alcohol abuse and seizure disorder, asthma, anxiety/depression, who presented 11/29/2022 with concern for recurrent seizure.  Apparently seizure was tonic-clonic generalized, 1 hour prior to arrival to this hospital at home, witnessed by her boyfriend, that started in the position laying in the couch.  Before that patient experienced oral for 2 days, in the form of seeing some smoke and bright lights until seizure happened.  She does not remember seizure itself, with some postictal confusion.  No bite tongue or urine incontinence.  Patient has been drinking every day 2-3 drinks a day in the last 2 years with a break for 3 months during summertime, when she did not experience any seizure.  Otherwise she reportedly had 5 seizures in the last 2 years.  Was followed by PCP, never seen by neurology, not on any antiseizure medications.  Earlier today she had some shakiness and had 1 alcoholic beverage.  She reported nausea and upper abdominal discomfort in the last 2 to 3 days.    I discussed the plan of care with patient.    Review of Systems  Review of Systems   Constitutional:  Negative for chills, fever and weight loss.   HENT:  Negative for ear pain, hearing loss and tinnitus.    Eyes:  Negative for blurred vision, double vision and photophobia.   Respiratory:  Negative for cough, hemoptysis and sputum production.    Cardiovascular:  Negative for chest pain, palpitations and orthopnea.   Gastrointestinal:   Positive for abdominal pain and nausea. Negative for constipation, diarrhea, heartburn and vomiting.   Genitourinary:  Negative for dysuria, flank pain, frequency and hematuria.   Musculoskeletal:  Negative for back pain, joint pain and neck pain.   Skin:  Negative for itching and rash.   Neurological:  Positive for seizures and headaches. Negative for tremors, speech change and focal weakness.   Endo/Heme/Allergies:  Negative for environmental allergies and polydipsia. Does not bruise/bleed easily.   Psychiatric/Behavioral:  Negative for hallucinations and substance abuse. The patient is not nervous/anxious.      Past Medical History   has a past medical history of Anxiety, ASTHMA, Depression, and EtOH dependence ([facility]).    Surgical History   has no past surgical history on file.     Family History  family history includes Cancer in her mother; Hypertension in her maternal grandmother.   Family history reviewed with patient. There is no family history that is pertinent to the chief complaint.     Social History   reports that she has quit smoking. She has never used smokeless tobacco. She reports current alcohol use of about 6.0 oz per week. She reports that she does not currently use drugs.    Allergies  Allergies   Allergen Reactions    Pcn [Penicillins] Unspecified     Childhood  Unsure of reaction       Medications  None       Physical Exam  Temp:  [36.7 °C (98 °F)] 36.7 °C (98 °F)  Pulse:  [103-124] 111  Resp:  [14-16] 14  BP: (124-140)/(79-97) 140/97  SpO2:  [91 %-96 %] 96 %  Blood Pressure: (!) 140/97   Temperature: 36.7 °C (98 °F)   Pulse: (!) 111   Respiration: 14   Pulse Oximetry: 96 %       Physical Exam  Vitals and nursing note reviewed.   Constitutional:       General: She is not in acute distress.     Appearance: Normal appearance.   HENT:      Head: Normocephalic and atraumatic.      Nose: Nose normal.      Mouth/Throat:      Mouth: Mucous membranes are moist.   Eyes:      Extraocular Movements:  Extraocular movements intact.      Pupils: Pupils are equal, round, and reactive to light.   Cardiovascular:      Rate and Rhythm: Regular rhythm. Tachycardia present.   Pulmonary:      Effort: Pulmonary effort is normal.      Breath sounds: Normal breath sounds.   Abdominal:      General: Abdomen is flat. There is no distension.      Tenderness: There is abdominal tenderness in the epigastric area. There is no guarding or rebound.   Musculoskeletal:         General: No swelling or deformity. Normal range of motion.      Cervical back: Normal range of motion and neck supple.   Skin:     General: Skin is warm and dry.   Neurological:      General: No focal deficit present.      Mental Status: She is alert and oriented to person, place, and time.   Psychiatric:         Mood and Affect: Mood normal.         Behavior: Behavior normal.       Laboratory:  Recent Labs     11/29/22 1956   WBC 4.8   RBC 4.67   HEMOGLOBIN 14.4   HEMATOCRIT 42.4   MCV 90.8   MCH 30.8   MCHC 34.0   RDW 44.0   PLATELETCT 132*   MPV 10.0     Recent Labs     11/29/22 1956   SODIUM 142   POTASSIUM 4.3   CHLORIDE 103   CO2 28   GLUCOSE 101*   BUN 8   CREATININE 0.56   CALCIUM 9.5     Recent Labs     11/29/22 1956   ALTSGPT 39   ASTSGOT 60*   ALKPHOSPHAT 126*   TBILIRUBIN 0.3   GLUCOSE 101*         No results for input(s): NTPROBNP in the last 72 hours.      No results for input(s): TROPONINT in the last 72 hours.    Imaging:  MR-BRAIN-W/O    (Results Pending)       X-Ray:  I have personally reviewed the images and compared with prior images.    Assessment/Plan:  Justification for Admission Status  I anticipate this patient will require at least two midnights for appropriate medical management, necessitating inpatient admission because seizure    Patient will need a Telemetry bed on NEUROLOGY service .  The need is secondary to seizure.    * Seizure (HCC)- (present on admission)  Assessment & Plan  Recurrent seizure in the setting of continuous  alcohol abuse  In order to decide on need for antiseizure medication, ordered MRI of the brain and EEG and will consider neurology consult tomorrow  Thiamine supplementation  Versed as needed for seizure  Seizure precautions      Gastritis  Assessment & Plan  Lipase pending  Abdomen is benign on exam  Antiacids  Clear liquid diet as tolerated    Alcohol abuse- (present on admission)  Assessment & Plan  Monitor for withdrawal.  Last drink was in a.m.   Likely alcohol withdrawal seizure earlier today  Will order gabapentin and Depakote as an alternative alcohol withdrawal protocol, due to report of reaction to Ativan (agitation with aggression)  Thiamine supplementation  Fall, aspiration, seizure precautions    Mild intermittent asthma without complication- (present on admission)  Assessment & Plan  Not in exacerbation  Monitor      VTE prophylaxis: enoxaparin ppx

## 2022-11-30 NOTE — PROCEDURES
INPATIENT ROUTINE VIDEO ELECTROENCEPHALOGRAM REPORT      REFERRING PROVIDER: Dr. Gamboa    DOS: 11/30/2022     TOTAL RECORDING TIME: 0 hours and 25 minutes of total recording time    INDICATION:  Mariia Samayoa 26 y.o. female presenting with seizure(s)    CURRENT ANTI-SEIZURE MEDICATIONS:   Neurontin (gabapentin) (200mg TID)  Versed (midazolam) (2mg PRN)  Versed (midazolam) (2.5mg @ 2032 11/29)    TECHNIQUE: Routine VEEG was set up by a Neurodiagnostic technologist who performed education to the patient and staff. A minimum of 23 electrodes and 23 channel recording was setup and performed by Neurodiagnostic technologist, in accordance with the international 10-20 system. The study was reviewed in bipolar and referential montages. The recording examined the patient in the  awake and drowsy state(s).     DESCRIPTION OF THE RECORD:  The background was continuous, symmetrical, and showed a 10-11 Hz posterior dominant rhythm . Reactivity was present. Spontaneous variability was present. State changes were present.   EEG Sleep: N2 sleep architecture was not seen.      ACTIVATION PROCEDURES:   Hyperventilation was performed by the patient for a total of 3 minutes. The technician performing the test noted good effort. This technique did not elicited any abnormalities on EEG.  and Intermittent Photic stimulation was performed in a stepwise fashion from 1 to 30 Hz and did not elicited any abnormalities on EEG.     ICTAL AND INTERICTAL FINDINGS:   No focal or generalized epileptiform activity noted.     No regional slowing was seen during this routine study.      No definite electrographic or electroclinical seizures.     EKG: Sampling of the EKG recording did not demonstrate any abnormalities      EVENTS:  None    INTERPRETATION:   Normal video EEG recording in the awake and drowsy state(s):  - No persistent focal asymmetries seen.  - Epileptiform discharges: No epileptiform discharges or other epileptiform phenomena  • Secondary to her decreased level of functioning from baseline, she will be admitted to the subacute rehabilitation facility and seen in consultation by a multidisciplinary rehabilitation team for evaluation and treatment to assist her in returning to her prior level of functioning seen.   - No seizures. Clinical correlation is recommended.      Note: This EEG does not rule the possibility of seizures  If the clinical suspicion remains high for seizures, a prolonged recording to capture clinical or subclinical events may be helpful.        Henry Jackson MD  Department of Neurology at [facility]  General Neurologist and Epileptologist  Director of [facility] Level III Comprehensive Epilepsy Program  Professor of Clinical Neurology, [facility].   Phone: [phone]  Fax: [phone]  E-mail: [email or web address]

## 2022-11-30 NOTE — ED NOTES
Med rec updated and complete. Allergies reviewed. Pt denies taking medications.  Confirmed name and date of birth.        Home pharmacy [facility] [phone]

## 2022-11-30 NOTE — PROGRESS NOTES
Neurology brief note    Discussed case with Dr. Walls, hospitalist.  26F admitted for recurrent seizures, now back to neurologic baseline.  Endorsed daily drinking history.  EEG without ongoing seizures, MRI brain without acute pathology (however, non-contrast study only) or clear structural nidus.  I do recommend initiating AED with keppra 1000mg BID given recurrent nature of her events.  May be discharged, and follow up in [facility].     Kenny Barrera MD   Neurology

## 2022-11-30 NOTE — ASSESSMENT & PLAN NOTE
- Recurrent seizure in the setting of continuous alcohol abuse  - MRI of brain w/o 11/30: within normal limits except for small punctate area of T2 hyperintensity in L parietal white matter likely representing nonspecific foci of gliosis, no evidence of mass lesion, heterotopic gray matter, gross cortical dysplasia or mesial temporal sclerosis.  - EEG 11/30: no ongoing seizures  - Neurology consulted 11/30. Per Dr. Barrera, no clear structural nidus for seizures seen on imaging. Recommended initiating AED with keppra 1000mg BID given recurrent nature of patient's events and follow up [facility].  - Thiamine supplementation  - Seizure precautions  - started on keppra 1000mg BID on 12/1, watching overnight to monitor for adverse reactions

## 2022-11-30 NOTE — ED NOTES
Pt's boyfriend alerted this RN that pt is becoming shaky and vomiting, pt states this happens when she hasn't eaten anything. ERP notified, pt provided food and water.  Pt updated on POC   denies pain/discomfort

## 2022-11-30 NOTE — ED NOTES
Pt medicated per MAR  Pt ambulated to restroom with steady gait x1 assistance. Hospital bed placed in room, in lowest and locked position. Pt on monitor, call light in reach

## 2022-12-01 ENCOUNTER — APPOINTMENT (OUTPATIENT)
Dept: RADIOLOGY | Facility: MEDICAL CENTER | Age: 27
DRG: 100 | End: 2022-12-01
Payer: COMMERCIAL

## 2022-12-01 PROBLEM — U07.1 COVID-19 VIRUS INFECTION: Status: ACTIVE | Noted: 2022-12-01

## 2022-12-01 LAB
ALBUMIN SERPL BCP-MCNC: 3.9 G/DL (ref 3.2–4.9)
ALBUMIN/GLOB SERPL: 1.6 G/DL
ALP SERPL-CCNC: 104 U/L (ref 30–99)
ALT SERPL-CCNC: 37 U/L (ref 2–50)
ANION GAP SERPL CALC-SCNC: 10 MMOL/L (ref 7–16)
AST SERPL-CCNC: 46 U/L (ref 12–45)
BASOPHILS # BLD AUTO: 0.4 % (ref 0–1.8)
BASOPHILS # BLD: 0.03 K/UL (ref 0–0.12)
BILIRUB SERPL-MCNC: 0.3 MG/DL (ref 0.1–1.5)
BUN SERPL-MCNC: 11 MG/DL (ref 8–22)
CALCIUM SERPL-MCNC: 9 MG/DL (ref 8.5–10.5)
CHLORIDE SERPL-SCNC: 101 MMOL/L (ref 96–112)
CO2 SERPL-SCNC: 29 MMOL/L (ref 20–33)
CREAT SERPL-MCNC: 0.7 MG/DL (ref 0.5–1.4)
EOSINOPHIL # BLD AUTO: 0.07 K/UL (ref 0–0.51)
EOSINOPHIL NFR BLD: 1 % (ref 0–6.9)
ERYTHROCYTE [DISTWIDTH] IN BLOOD BY AUTOMATED COUNT: 45.7 FL (ref 35.9–50)
GFR SERPLBLD CREATININE-BSD FMLA CKD-EPI: 121 ML/MIN/1.73 M 2
GLOBULIN SER CALC-MCNC: 2.5 G/DL (ref 1.9–3.5)
GLUCOSE BLD STRIP.AUTO-MCNC: 76 MG/DL (ref 65–99)
GLUCOSE BLD STRIP.AUTO-MCNC: 80 MG/DL (ref 65–99)
GLUCOSE SERPL-MCNC: 84 MG/DL (ref 65–99)
HCT VFR BLD AUTO: 44.4 % (ref 37–47)
HGB BLD-MCNC: 14.6 G/DL (ref 12–16)
IMM GRANULOCYTES # BLD AUTO: 0.02 K/UL (ref 0–0.11)
IMM GRANULOCYTES NFR BLD AUTO: 0.3 % (ref 0–0.9)
LYMPHOCYTES # BLD AUTO: 2.76 K/UL (ref 1–4.8)
LYMPHOCYTES NFR BLD: 40.6 % (ref 22–41)
MAGNESIUM SERPL-MCNC: 2.3 MG/DL (ref 1.5–2.5)
MCH RBC QN AUTO: 30.8 PG (ref 27–33)
MCHC RBC AUTO-ENTMCNC: 32.9 G/DL (ref 33.6–35)
MCV RBC AUTO: 93.7 FL (ref 81.4–97.8)
MONOCYTES # BLD AUTO: 0.86 K/UL (ref 0–0.85)
MONOCYTES NFR BLD AUTO: 12.6 % (ref 0–13.4)
NEUTROPHILS # BLD AUTO: 3.06 K/UL (ref 2–7.15)
NEUTROPHILS NFR BLD: 45.1 % (ref 44–72)
NRBC # BLD AUTO: 0 K/UL
NRBC BLD-RTO: 0 /100 WBC
PHOSPHATE SERPL-MCNC: 4.8 MG/DL (ref 2.5–4.5)
PLATELET # BLD AUTO: 131 K/UL (ref 164–446)
PMV BLD AUTO: 10.2 FL (ref 9–12.9)
POTASSIUM SERPL-SCNC: 4.3 MMOL/L (ref 3.6–5.5)
PROT SERPL-MCNC: 6.4 G/DL (ref 6–8.2)
RBC # BLD AUTO: 4.74 M/UL (ref 4.2–5.4)
SODIUM SERPL-SCNC: 140 MMOL/L (ref 135–145)
WBC # BLD AUTO: 6.8 K/UL (ref 4.8–10.8)

## 2022-12-01 PROCEDURE — A9270 NON-COVERED ITEM OR SERVICE: HCPCS

## 2022-12-01 PROCEDURE — A9270 NON-COVERED ITEM OR SERVICE: HCPCS | Performed by: STUDENT IN AN ORGANIZED HEALTH CARE EDUCATION/TRAINING PROGRAM

## 2022-12-01 PROCEDURE — 85025 COMPLETE CBC W/AUTO DIFF WBC: CPT

## 2022-12-01 PROCEDURE — 80053 COMPREHEN METABOLIC PANEL: CPT

## 2022-12-01 PROCEDURE — 700102 HCHG RX REV CODE 250 W/ 637 OVERRIDE(OP): Performed by: INTERNAL MEDICINE

## 2022-12-01 PROCEDURE — 84100 ASSAY OF PHOSPHORUS: CPT

## 2022-12-01 PROCEDURE — 770020 HCHG ROOM/CARE - TELE (206)

## 2022-12-01 PROCEDURE — A9270 NON-COVERED ITEM OR SERVICE: HCPCS | Performed by: INTERNAL MEDICINE

## 2022-12-01 PROCEDURE — 99232 SBSQ HOSP IP/OBS MODERATE 35: CPT | Mod: GC | Performed by: HOSPITALIST

## 2022-12-01 PROCEDURE — 700102 HCHG RX REV CODE 250 W/ 637 OVERRIDE(OP)

## 2022-12-01 PROCEDURE — 83735 ASSAY OF MAGNESIUM: CPT

## 2022-12-01 PROCEDURE — 71046 X-RAY EXAM CHEST 2 VIEWS: CPT

## 2022-12-01 PROCEDURE — 700102 HCHG RX REV CODE 250 W/ 637 OVERRIDE(OP): Performed by: STUDENT IN AN ORGANIZED HEALTH CARE EDUCATION/TRAINING PROGRAM

## 2022-12-01 PROCEDURE — 82962 GLUCOSE BLOOD TEST: CPT

## 2022-12-01 RX ORDER — LEVETIRACETAM 500 MG/1
1000 TABLET ORAL 2 TIMES DAILY
Status: DISCONTINUED | OUTPATIENT
Start: 2022-12-01 | End: 2022-12-02 | Stop reason: HOSPADM

## 2022-12-01 RX ORDER — ZOLPIDEM TARTRATE 5 MG/1
5 TABLET ORAL NIGHTLY PRN
Status: DISCONTINUED | OUTPATIENT
Start: 2022-12-01 | End: 2022-12-02

## 2022-12-01 RX ADMIN — VALPROIC ACID 250 MG: 250 SOLUTION ORAL at 05:31

## 2022-12-01 RX ADMIN — FAMOTIDINE 20 MG: 20 TABLET, FILM COATED ORAL at 18:32

## 2022-12-01 RX ADMIN — SUCRALFATE 1 G: 1 TABLET ORAL at 23:20

## 2022-12-01 RX ADMIN — GABAPENTIN 200 MG: 100 CAPSULE ORAL at 18:32

## 2022-12-01 RX ADMIN — FAMOTIDINE 20 MG: 20 TABLET, FILM COATED ORAL at 05:31

## 2022-12-01 RX ADMIN — LEVETIRACETAM 1000 MG: 500 TABLET, FILM COATED ORAL at 10:25

## 2022-12-01 RX ADMIN — GABAPENTIN 200 MG: 100 CAPSULE ORAL at 12:00

## 2022-12-01 RX ADMIN — SUCRALFATE 1 G: 1 TABLET ORAL at 12:00

## 2022-12-01 RX ADMIN — ACETAMINOPHEN 650 MG: 325 TABLET, FILM COATED ORAL at 22:07

## 2022-12-01 RX ADMIN — VALPROIC ACID 250 MG: 250 SOLUTION ORAL at 18:32

## 2022-12-01 RX ADMIN — THIAMINE HCL TAB 100 MG 200 MG: 100 TAB at 05:31

## 2022-12-01 RX ADMIN — NICOTINE POLACRILEX 2 MG: 2 GUM, CHEWING BUCCAL at 20:16

## 2022-12-01 RX ADMIN — SUCRALFATE 1 G: 1 TABLET ORAL at 18:32

## 2022-12-01 RX ADMIN — ZOLPIDEM TARTRATE 5 MG: 5 TABLET ORAL at 23:21

## 2022-12-01 RX ADMIN — GABAPENTIN 200 MG: 100 CAPSULE ORAL at 05:30

## 2022-12-01 RX ADMIN — SUCRALFATE 1 G: 1 TABLET ORAL at 05:31

## 2022-12-01 RX ADMIN — NICOTINE TRANSDERMAL SYSTEM 21 MG: 21 PATCH, EXTENDED RELEASE TRANSDERMAL at 05:30

## 2022-12-01 ASSESSMENT — ENCOUNTER SYMPTOMS
FOCAL WEAKNESS: 0
TINGLING: 0
COUGH: 0
MYALGIAS: 0
WEAKNESS: 0
SPUTUM PRODUCTION: 0
HEARTBURN: 0
CHILLS: 0
BACK PAIN: 0
SPEECH CHANGE: 0
VOMITING: 0
SHORTNESS OF BREATH: 0
PALPITATIONS: 0
DIARRHEA: 0
SEIZURES: 1
TREMORS: 0
NAUSEA: 0
DIZZINESS: 0
NECK PAIN: 0
ORTHOPNEA: 0
NERVOUS/ANXIOUS: 1
FEVER: 0
HEADACHES: 1
BLOOD IN STOOL: 0
SENSORY CHANGE: 0
WEIGHT LOSS: 0
WHEEZING: 0
EYES NEGATIVE: 1
CONSTIPATION: 0
HEMOPTYSIS: 0

## 2022-12-01 ASSESSMENT — LIFESTYLE VARIABLES
TOTAL SCORE: VERY MILD ITCHING, PINS AND NEEDLES SENSATION, BURNING OR NUMBNESS
AGITATION: NORMAL ACTIVITY
VISUAL DISTURBANCES: NOT PRESENT
PAROXYSMAL SWEATS: NO SWEAT VISIBLE
AUDITORY DISTURBANCES: NOT PRESENT
NAUSEA AND VOMITING: NO NAUSEA AND NO VOMITING
ORIENTATION AND CLOUDING OF SENSORIUM: ORIENTED AND CAN DO SERIAL ADDITIONS
HEADACHE, FULLNESS IN HEAD: NOT PRESENT
ANXIETY: NO ANXIETY (AT EASE)
NAUSEA AND VOMITING: NO NAUSEA AND NO VOMITING
TREMOR: *
SUBSTANCE_ABUSE: 1
AUDITORY DISTURBANCES: NOT PRESENT
TOTAL SCORE: 2
AGITATION: NORMAL ACTIVITY
TOTAL SCORE: 4
HEADACHE, FULLNESS IN HEAD: NOT PRESENT
ANXIETY: MILDLY ANXIOUS
PAROXYSMAL SWEATS: NO SWEAT VISIBLE
ORIENTATION AND CLOUDING OF SENSORIUM: ORIENTED AND CAN DO SERIAL ADDITIONS
TREMOR: *
VISUAL DISTURBANCES: NOT PRESENT

## 2022-12-01 ASSESSMENT — PAIN DESCRIPTION - PAIN TYPE
TYPE: ACUTE PAIN
TYPE: ACUTE PAIN

## 2022-12-01 NOTE — CARE PLAN
The patient is Stable - Low risk of patient condition declining or worsening    Shift Goals  Clinical Goals: [facility] protocol, safety, monitor for seizures  Patient Goals: Rest  Family Goals: RUPINDER    Progress made toward(s) clinical / shift goals:    Problem: Seizure Precautions  Goal: Implementation of seizure precautions  Description: Target End Date:  Prior to discharge or change in level of care    1.  Padded side rails up at all times  2.  Suction equipment and oxygen delivery system at bedside  3.  Continuous pulse oximeter in use  4.  Implement fall precautions, bed alarm on, bed in lowest position  5.  IV access (per order)  6.  Provide low stimulus environment, avoid exposure to triggers  7.  Instruct patient to use call light/seizure button if having warning signs of impending seizure  Outcome: Progressing     Problem: Psychosocial  Goal: Patient's level of anxiety will decrease  Description: Target End Date:  1-3 days or as soon as patient condition allows    1.  Collaborate with patient and family/caregiver to identify triggers and develop strategies to cope with anxiety  2.  Implement stimuli reduction, calming techniques  3.  Pharmacologic management per provider order  4.  Encourage patient/family/care giver participation  5.  Collaborate with interdisciplinary team including Psychologist or Behavioral Health Team as needed  Outcome: Progressing       Patient is not progressing towards the following goals:

## 2022-12-01 NOTE — HOSPITAL COURSE
This is a 26-year-old female with past medical history of alcohol use disorder, anxiety/depression on Zoloft, seizure disorder, tobacco use disorder and asthma who was admitted on 11/29/2022 with tonic clonic generalized seizure 1 hour prior to arrival to the ED.    Patient reports has been drinking alcohol every day for the past 2 years.  She reports having a 5 seizures over the past 2 years.  During the seizure like activity, denies any tongue biting, bowel or urinary incontinence. On day of admission patient admits to tremors and then consumed 1 alcoholic beverage.     Labs significant for CPK of 477, UDS positive for benzos, alcohol level is negative, COVID swab positive.  Brain MRI negative for any acute pathology.  EEG negative for any ongoing seizures.  Neurology was consulted, recommend starting patient on Keppra 1000 g twice daily.  Patient is to follow-up with [facility] outpatient.

## 2022-12-01 NOTE — PROGRESS NOTES
Monitor summary: SR/ST , [place] 0.15, QRS 0.08, QT 0.36, with rare PACs per strip from monitor room.

## 2022-12-01 NOTE — PROGRESS NOTES
4 Eyes Skin Assessment Completed by SAKINA Arizmendi and SAKINA Murray.    Head WDL  Ears WDL  Nose WDL  Mouth WDL  Neck WDL  Breast/Chest WDL  Shoulder Blades WDL  Spine WDL  (R) Arm/Elbow/Hand WDL  (L) Arm/Elbow/Hand WDL  Abdomen WDL  Groin WDL  Scrotum/Coccyx/Buttocks WDL  (R) Leg Bruising  (L) Leg Bruising  (R) Heel/Foot/Toe WDL  (L) Heel/Foot/Toe WDL          Devices In Places Tele Box and Blood Pressure Cuff      Interventions In Place Pillows and Pressure Redistribution Mattress    Possible Skin Injury No    Pictures Uploaded Into Epic N/A  Wound Consult Placed N/A  RN Wound Prevention Protocol Ordered No

## 2022-12-01 NOTE — CARE PLAN
The patient is Watcher - Medium risk of patient condition declining or worsening    Shift Goals  Clinical Goals: [facility] protocol, safety, monitor for seizures  Patient Goals: Rest  Family Goals: RUPINDER    Progress made toward(s) clinical / shift goals:    Problem: Knowledge Deficit - Standard  Goal: Patient and family/care givers will demonstrate understanding of plan of care, disease process/condition, diagnostic tests and medications  Outcome: Progressing     Problem: Optimal Care for Alcohol Withdrawal  Goal: Optimal Care for the alcohol withdrawal patient  Outcome: Progressing     Problem: Seizure Precautions  Goal: Implementation of seizure precautions  Outcome: Progressing     Problem: Psychosocial  Goal: Patient's level of anxiety will decrease  Outcome: Progressing

## 2022-12-01 NOTE — PROGRESS NOTES
Monitor room had called to notify that pt reached up to the 150s while pt was ambulating and went right back down to the 120s- 130s. Notified MD Faviola Salamanca.

## 2022-12-01 NOTE — CARE PLAN
Problem: Knowledge Deficit - Standard  Goal: Patient and family/care givers will demonstrate understanding of plan of care, disease process/condition, diagnostic tests and medications  Outcome: Progressing     Problem: Optimal Care for Alcohol Withdrawal  Goal: Optimal Care for the alcohol withdrawal patient  Outcome: Progressing     Problem: Seizure Precautions  Goal: Implementation of seizure precautions  Outcome: Progressing     Problem: Lifestyle Changes  Goal: Patient's ability to identify lifestyle changes and available resources to help reduce recurrence of condition will improve  Outcome: Progressing     Problem: Psychosocial  Goal: Patient's level of anxiety will decrease  Outcome: Progressing  Goal: Spiritual and cultural needs incorporated into hospitalization  Outcome: Progressing     Problem: Risk for Aspiration  Goal: Patient's risk for aspiration will be absent or decrease  Outcome: Progressing   The patient is Stable - Low risk of patient condition declining or worsening    Shift Goals  Clinical Goals: Monitor for Seizure  Patient Goals: Discharge  Family Goals: No family at bedside    Progress made toward(s) clinical / shift goals:  Pt has not shown any seizure activity, pt has been started on Keppra today. Pts discharge is pending for tonight or tomorrow.    Patient is not progressing towards the following goals:

## 2022-12-02 ENCOUNTER — PHARMACY VISIT (OUTPATIENT)
Dept: PHARMACY | Facility: MEDICAL CENTER | Age: 27
End: 2022-12-02
Payer: COMMERCIAL

## 2022-12-02 VITALS
RESPIRATION RATE: 18 BRPM | SYSTOLIC BLOOD PRESSURE: 133 MMHG | TEMPERATURE: 98.2 F | DIASTOLIC BLOOD PRESSURE: 91 MMHG | BODY MASS INDEX: 28.17 KG/M2 | HEART RATE: 112 BPM | OXYGEN SATURATION: 100 % | WEIGHT: 169.09 LBS | HEIGHT: 65 IN

## 2022-12-02 LAB
ALBUMIN SERPL BCP-MCNC: 4.4 G/DL (ref 3.2–4.9)
ALBUMIN/GLOB SERPL: 1.5 G/DL
ALP SERPL-CCNC: 105 U/L (ref 30–99)
ALT SERPL-CCNC: 30 U/L (ref 2–50)
ANION GAP SERPL CALC-SCNC: 13 MMOL/L (ref 7–16)
AST SERPL-CCNC: 27 U/L (ref 12–45)
BASOPHILS # BLD AUTO: 0.5 % (ref 0–1.8)
BASOPHILS # BLD: 0.05 K/UL (ref 0–0.12)
BILIRUB SERPL-MCNC: 0.5 MG/DL (ref 0.1–1.5)
BUN SERPL-MCNC: 12 MG/DL (ref 8–22)
CALCIUM SERPL-MCNC: 9.5 MG/DL (ref 8.5–10.5)
CHLORIDE SERPL-SCNC: 101 MMOL/L (ref 96–112)
CO2 SERPL-SCNC: 23 MMOL/L (ref 20–33)
CREAT SERPL-MCNC: 0.62 MG/DL (ref 0.5–1.4)
EOSINOPHIL # BLD AUTO: 0.1 K/UL (ref 0–0.51)
EOSINOPHIL NFR BLD: 0.9 % (ref 0–6.9)
ERYTHROCYTE [DISTWIDTH] IN BLOOD BY AUTOMATED COUNT: 44.9 FL (ref 35.9–50)
GFR SERPLBLD CREATININE-BSD FMLA CKD-EPI: 124 ML/MIN/1.73 M 2
GLOBULIN SER CALC-MCNC: 2.9 G/DL (ref 1.9–3.5)
GLUCOSE SERPL-MCNC: 90 MG/DL (ref 65–99)
HCT VFR BLD AUTO: 48.7 % (ref 37–47)
HGB BLD-MCNC: 15.9 G/DL (ref 12–16)
IMM GRANULOCYTES # BLD AUTO: 0.09 K/UL (ref 0–0.11)
IMM GRANULOCYTES NFR BLD AUTO: 0.8 % (ref 0–0.9)
LYMPHOCYTES # BLD AUTO: 3.07 K/UL (ref 1–4.8)
LYMPHOCYTES NFR BLD: 28.8 % (ref 22–41)
MAGNESIUM SERPL-MCNC: 2.2 MG/DL (ref 1.5–2.5)
MCH RBC QN AUTO: 30.3 PG (ref 27–33)
MCHC RBC AUTO-ENTMCNC: 32.6 G/DL (ref 33.6–35)
MCV RBC AUTO: 92.9 FL (ref 81.4–97.8)
MONOCYTES # BLD AUTO: 1.52 K/UL (ref 0–0.85)
MONOCYTES NFR BLD AUTO: 14.3 % (ref 0–13.4)
NEUTROPHILS # BLD AUTO: 5.83 K/UL (ref 2–7.15)
NEUTROPHILS NFR BLD: 54.7 % (ref 44–72)
NRBC # BLD AUTO: 0 K/UL
NRBC BLD-RTO: 0 /100 WBC
PHOSPHATE SERPL-MCNC: 4.6 MG/DL (ref 2.5–4.5)
PLATELET # BLD AUTO: 147 K/UL (ref 164–446)
PMV BLD AUTO: 9.7 FL (ref 9–12.9)
POTASSIUM SERPL-SCNC: 4.2 MMOL/L (ref 3.6–5.5)
PROT SERPL-MCNC: 7.3 G/DL (ref 6–8.2)
RBC # BLD AUTO: 5.24 M/UL (ref 4.2–5.4)
SODIUM SERPL-SCNC: 137 MMOL/L (ref 135–145)
WBC # BLD AUTO: 10.7 K/UL (ref 4.8–10.8)

## 2022-12-02 PROCEDURE — A9270 NON-COVERED ITEM OR SERVICE: HCPCS | Performed by: INTERNAL MEDICINE

## 2022-12-02 PROCEDURE — 700102 HCHG RX REV CODE 250 W/ 637 OVERRIDE(OP): Performed by: INTERNAL MEDICINE

## 2022-12-02 PROCEDURE — 700102 HCHG RX REV CODE 250 W/ 637 OVERRIDE(OP)

## 2022-12-02 PROCEDURE — 84100 ASSAY OF PHOSPHORUS: CPT

## 2022-12-02 PROCEDURE — A9270 NON-COVERED ITEM OR SERVICE: HCPCS

## 2022-12-02 PROCEDURE — 85025 COMPLETE CBC W/AUTO DIFF WBC: CPT

## 2022-12-02 PROCEDURE — RXMED WILLOW AMBULATORY MEDICATION CHARGE: Performed by: INTERNAL MEDICINE

## 2022-12-02 PROCEDURE — 99238 HOSP IP/OBS DSCHRG MGMT 30/<: CPT | Mod: GC | Performed by: HOSPITALIST

## 2022-12-02 PROCEDURE — 80053 COMPREHEN METABOLIC PANEL: CPT

## 2022-12-02 PROCEDURE — 83735 ASSAY OF MAGNESIUM: CPT

## 2022-12-02 RX ORDER — LEVETIRACETAM 1000 MG/1
1000 TABLET ORAL 2 TIMES DAILY
Qty: 120 TABLET | Refills: 1 | Status: SHIPPED | OUTPATIENT
Start: 2022-12-02 | End: 2023-08-17

## 2022-12-02 RX ORDER — LANOLIN ALCOHOL/MO/W.PET/CERES
200 CREAM (GRAM) TOPICAL DAILY
Qty: 90 TABLET | Refills: 0 | Status: SHIPPED | OUTPATIENT
Start: 2022-12-03 | End: 2023-08-17

## 2022-12-02 RX ORDER — GUAIFENESIN/DEXTROMETHORPHAN 100-10MG/5
10 SYRUP ORAL EVERY 6 HOURS PRN
Qty: 840 ML | Refills: 1 | Status: SHIPPED | OUTPATIENT
Start: 2022-12-02 | End: 2023-08-17

## 2022-12-02 RX ORDER — FOLIC ACID 1 MG/1
1 TABLET ORAL DAILY
Qty: 90 TABLET | Refills: 1 | Status: SHIPPED | OUTPATIENT
Start: 2022-12-02 | End: 2023-08-17

## 2022-12-02 RX ADMIN — VALPROIC ACID 250 MG: 250 SOLUTION ORAL at 04:35

## 2022-12-02 RX ADMIN — FAMOTIDINE 20 MG: 20 TABLET, FILM COATED ORAL at 04:35

## 2022-12-02 RX ADMIN — NICOTINE TRANSDERMAL SYSTEM 21 MG: 21 PATCH, EXTENDED RELEASE TRANSDERMAL at 04:35

## 2022-12-02 RX ADMIN — SUCRALFATE 1 G: 1 TABLET ORAL at 12:52

## 2022-12-02 RX ADMIN — LEVETIRACETAM 1000 MG: 500 TABLET, FILM COATED ORAL at 10:12

## 2022-12-02 RX ADMIN — GABAPENTIN 200 MG: 100 CAPSULE ORAL at 12:51

## 2022-12-02 RX ADMIN — SUCRALFATE 1 G: 1 TABLET ORAL at 04:35

## 2022-12-02 RX ADMIN — THIAMINE HCL TAB 100 MG 200 MG: 100 TAB at 04:35

## 2022-12-02 RX ADMIN — GABAPENTIN 200 MG: 100 CAPSULE ORAL at 04:35

## 2022-12-02 NOTE — DISCHARGE PLANNING
Case Management Discharge Planning    Admission Date: 11/29/2022  GMLOS: 2.6  ALOS: 2    6-Clicks ADL Score:    6-Clicks Mobility Score:        Anticipated Discharge Dispo:  Home    DME Needed: none    Action(s) Taken: Talked to JOSH LUA and she will send the prescriptions to [facility]. CM called Abby at [facility] and asked if they could call CM if there are any copays so CM can talk to pt about charging to account.     Escalations Completed: {DC Escalations:54943}    Medically Clear: {YES / NO:36032}    Next Steps: ***    Barriers to Discharge: {DC Barriers:85712}    Is the patient up for discharge tomorrow: {Is Patient Up for Discharge Tomorrow:09880}

## 2022-12-02 NOTE — PROGRESS NOTES
Reported to Dr. Cinthia Alvarez that while ambulating the pts heart rate did end up sustaining into the 150s for a few minutes but went back down to the 110-120s range. The pt was not symptomatic, per MD, no new orders.

## 2022-12-02 NOTE — ASSESSMENT & PLAN NOTE
- Patient tested COVID+ on admission 11/30  - no respiratory symptoms, CXR 12/1 negative for acute cardiopulmonary processes  - not hypoxic, no need for supplemental oxygen  - advised patient to quarantine for at least 5 days in residence post discharge given no respiratory symptoms per CDC guidelines

## 2022-12-02 NOTE — PROGRESS NOTES
Monitor Summary: SR-ST , [place] .0.14, QRS .0.06, QT .0.36 with HR up to 150 bpm per strip from monitor room

## 2022-12-02 NOTE — PROGRESS NOTES
"[facility] Internal Medicine Daily Progress Note    Date of Service  12/1/2022    R Team: R IM Purple Team   Attending: ESTEFANI Heck M.d.  Senior Resident: Dr. Jadyn LUA  Intern:  Dr. Kim LUA  Contact Number: [phone]    Chief Complaint  Mariia Samayoa is a 26 y.o. female admitted 11/29/2022 with seizures and found to be covid positive.    Hospital Course  This is a 26-year-old female with past medical history of alcohol use disorder, anxiety/depression on Zoloft, seizure disorder, tobacco use disorder and asthma who was admitted on 11/29/2022 with tonic clonic generalized seizure 1 hour prior to arrival to the ED.    Patient reports has been drinking alcohol every day for the past 2 years.  She reports having a 5 seizures over the past 2 years.  During the seizure like activity, denies any tongue biting, bowel or urinary incontinence. On day of admission patient admits to tremors and then consumed 1 alcoholic beverage.     Labs significant for CPK of 477, UDS positive for benzos, alcohol level is negative, COVID swab positive.  Brain MRI negative for any acute pathology.  EEG negative for any ongoing seizures.  Neurology was consulted, recommend starting patient on Keppra 1000 g twice daily.  Patient is to follow-up with [facility] neurology clinic outpatient.    Interval Problem Update    No acute events overnight. Seen at bedside this morning. No new complaints other than some headaches. Anxious to return home. Started on keppra today and informed patient she will need to stay overnight to monitor for adverse effects. Also obtained CXR (given patient's covid+ status) which was unremarkable. Patient was visibly upset when told her 's license would be suspended in setting of her seizures. Patient was seen later on in the day, had a discussion with patient for >20 minutes during which time she apologized for being emotional and \"really mean to all of you\" this morning and expressed her understanding for " "her delayed discharge due to being started on keppra as well as her license being suspended due to concern for her seizures. Patient expressed frustration and feeling \"trapped\" about not being able to go to the bathroom or move around in her bed without triggering a bed alarm. Placed order stating that it is okay for patient to be off bed alarm and to walk around in her room. Patient understands that she is not able to walk in the hallways due to her covid positive status. Anticipated discharge tomorrow morning 12/2.     I have discussed this patient's plan of care and discharge plan at IDT rounds today with Case Management, Nursing, Nursing leadership, and other members of the IDT team.    Consultants/Specialty  Neurology    Code Status  Full Code    Disposition  Patient is medically cleared for discharge.   Anticipate discharge to to home with close outpatient follow-up.  I have placed the appropriate orders for post-discharge needs.    Review of Systems  Review of Systems   Constitutional:  Negative for chills, fever, malaise/fatigue and weight loss.   HENT: Negative.     Eyes: Negative.    Respiratory:  Negative for cough, hemoptysis, sputum production, shortness of breath and wheezing.    Cardiovascular:  Negative for chest pain, palpitations, orthopnea and leg swelling.   Gastrointestinal:  Negative for blood in stool, constipation, diarrhea, heartburn, nausea and vomiting.   Genitourinary: Negative.    Musculoskeletal:  Negative for back pain, joint pain, myalgias and neck pain.   Skin: Negative.    Neurological:  Positive for seizures and headaches. Negative for dizziness, tingling, tremors, sensory change, speech change, focal weakness and weakness.   Endo/Heme/Allergies: Negative.    Psychiatric/Behavioral:  Positive for substance abuse. The patient is nervous/anxious.       Physical Exam  Temp:  [36.2 °C (97.2 °F)-37.2 °C (99 °F)] 36.2 °C (97.2 °F)  Pulse:  [] 97  Resp:  [16-18] 18  BP: " (125-147)/(82-96) 128/91  SpO2:  [96 %-100 %] 98 %    Physical Exam  Vitals and nursing note reviewed.   Constitutional:       General: She is not in acute distress.     Appearance: She is not toxic-appearing.   HENT:      Head: Normocephalic.      Mouth/Throat:      Mouth: Mucous membranes are moist.   Eyes:      General:         Right eye: No discharge.         Left eye: No discharge.   Cardiovascular:      Rate and Rhythm: Normal rate and regular rhythm.   Pulmonary:      Effort: Pulmonary effort is normal. No respiratory distress.      Breath sounds: No wheezing or rales.   Abdominal:      Palpations: Abdomen is soft.      Tenderness: There is no abdominal tenderness.   Musculoskeletal:         General: No swelling.      Cervical back: Neck supple.   Skin:     General: Skin is warm and dry.      Coloration: Skin is pale.   Neurological:      General: No focal deficit present.      Mental Status: She is alert and oriented to person, place, and time. Mental status is at baseline.   Psychiatric:         Mood and Affect: Mood normal.         Behavior: Behavior normal.         Thought Content: Thought content normal.         Judgment: Judgment normal.       Fluids    Intake/Output Summary (Last 24 hours) at 12/1/2022 1940  Last data filed at 12/1/2022 1400  Gross per 24 hour   Intake 760 ml   Output --   Net 760 ml       Laboratory  Recent Labs     11/29/22 1956 12/01/22  0243   WBC 4.8 6.8   RBC 4.67 4.74   HEMOGLOBIN 14.4 14.6   HEMATOCRIT 42.4 44.4   MCV 90.8 93.7   MCH 30.8 30.8   MCHC 34.0 32.9*   RDW 44.0 45.7   PLATELETCT 132* 131*   MPV 10.0 10.2     Recent Labs     11/29/22 1956 12/01/22  0243   SODIUM 142 140   POTASSIUM 4.3 4.3   CHLORIDE 103 101   CO2 28 29   GLUCOSE 101* 84   BUN 8 11   CREATININE 0.56 0.70   CALCIUM 9.5 9.0                   Imaging  DX-CHEST-2 VIEWS   Final Result      No acute cardiopulmonary abnormality.      MR-BRAIN-W/O   Final Result      1. MRI of the brain without contrast  within normal limits except for a small punctate area of T2 hyperintensity in the left parietal white matter likely representing nonspecific foci of gliosis..      2. No evidence of mass lesion, heterotopic gray matter, gross cortical dysplasia, or mesial temporal sclerosis.           Assessment/Plan  Problem Representation:    * Seizure (HCC)- (present on admission)  Assessment & Plan  - Recurrent seizure in the setting of continuous alcohol abuse  - MRI of brain w/o 11/30: within normal limits except for small punctate area of T2 hyperintensity in L parietal white matter likely representing nonspecific foci of gliosis, no evidence of mass lesion, heterotopic gray matter, gross cortical dysplasia or mesial temporal sclerosis.  - EEG 11/30: no ongoing seizures  - Neurology consulted 11/30. Per Dr. Barrera, no clear structural nidus for seizures seen on imaging. Recommended initiating AED with keppra 1000mg BID given recurrent nature of patient's events and follow up [facility] neurology clinic.  - Thiamine supplementation  - Seizure precautions  - started on keppra 1000mg BID on 12/1, watching overnight to monitor for adverse reactions    COVID-19 virus infection  Assessment & Plan  - Patient tested COVID+ on admission 11/30  - no respiratory symptoms, CXR 12/1 negative for acute cardiopulmonary processes  - not hypoxic, no need for supplemental oxygen  - advised patient to quarantine for at least 5 days in residence post discharge given no respiratory symptoms per CDC guidelines    Alcohol abuse- (present on admission)  Assessment & Plan  - Monitor for withdrawal.  Last drink was in a.m of admission on 11/30.  - Likely alcohol withdrawal seizure earlier today  - Will order gabapentin and Depakote as an alternative alcohol withdrawal protocol due to report of reaction to Ativan (agitation with aggression)  - Thiamine supplementation  - Fall, aspiration, seizure precautions    Gastritis  Assessment & Plan  - Lipase wnl at  22  - Abdomen is benign on exam  - Antiacids inpatient  - Regular diet    Mild intermittent asthma without complication- (present on admission)  Assessment & Plan  - Not in exacerbation  - no respiratory symptoms  - Monitor for now       VTE prophylaxis: SCDs/TEDs    I have performed a physical exam and reviewed and updated ROS and Plan today (12/1/2022). In review of yesterday's note (11/30/2022), there are no changes except as documented above.

## 2022-12-02 NOTE — CARE PLAN
The patient is Stable - Low risk of patient condition declining or worsening    Shift Goals  Clinical Goals: Monitor for seizure activity;  Patient Goals: Sleep and discharge in the AM;  Family Goals: RUPINDER;      Problem: Knowledge Deficit - Standard  Goal: Patient and family/care givers will demonstrate understanding of plan of care, disease process/condition, diagnostic tests and medications  Outcome: Progressing  Note: Plan of care updated. Will continue seizure medications and monitor for seizure like activity throughout shift. Anticipate discharge tomorrow 12/2. Patient verbalizes understanding.      Problem: Seizure Precautions  Goal: Implementation of seizure precautions  Outcome: Progressing  Note: Patient admitted for seizure activity. Assured seizure precautions in place. Previous IV infiltrated. New PIV placed to assure access.

## 2022-12-02 NOTE — CARE PLAN
The patient is Stable - Low risk of patient condition declining or worsening    Shift Goals  Clinical Goals: Monitor for seizures  Patient Goals: Discharge  Family Goals: No family at bedside    Progress made toward(s) clinical / shift goals:  Pt has been free of seizures today, pts plan is to discharge today.    Patient is not progressing towards the following goals:

## 2022-12-02 NOTE — DISCHARGE SUMMARY
[facility] Internal Medicine Discharge Summary    Attending: Dr. Heck   Senior Resident: Dr. Smith   Intern:  Kim Allen   Contact Number: [phone]    CHIEF COMPLAINT ON ADMISSION  Chief Complaint   Patient presents with    Seizure     Pt's boyfriend witnessed pt having a grand mal seizure about 1 hour PTA that lasted approximately 5 minutes.  Pt reports history but takes no medications and has not seen a neurologist.        Reason for Admission  EMS     Admission Date  11/29/2022    CODE STATUS  Full Code    HPI & HOSPITAL COURSE  This is a 26 y.o. female here with past medical history of anxiety/depression, alcohol use above recommended limits, tobacco use, seizure disorder, admitted 11/29 for an episode of generalized tonic-clonic seizures that lasted around 1 hour prior to arrival to the ED.  She was found to be COVID-positive-asymptomatic, on room air, no x-ray changes.  Neurology was consulted and patient underwent EEG and MRI both were negative and she was started on Keppra 1000 mg twice daily per neurology's recommendations.  For concerns of alcohol withdrawal she was started on gabapentin and Depakote.  ADTP services were offered to the patient that she declined.    She remained asymptomatic from COVID during her hospital stay, not requiring any therapy and did not have any recurrent seizures.  DMV paperwork for suspension of driving license was completed.  Outpatient neurology follow-up scheduled for her.  Meds to beds arranged on discharge.    Therefore, she is discharged in good and stable condition to home with close outpatient follow-up.    The patient recovered much more quickly than anticipated on admission.    Discharge Date  12/2/2022    Physical Exam on Day of Discharge  Physical Exam  Constitutional:       General: She is not in acute distress.     Appearance: She is not ill-appearing.   HENT:      Head: Normocephalic and atraumatic.      Right Ear: External ear normal.      Left Ear:  External ear normal.      Nose: Nose normal. No congestion.      Mouth/Throat:      Mouth: Mucous membranes are moist.   Eyes:      Extraocular Movements: Extraocular movements intact.      Pupils: Pupils are equal, round, and reactive to light.   Cardiovascular:      Rate and Rhythm: Normal rate and regular rhythm.      Pulses: Normal pulses.   Pulmonary:      Effort: Pulmonary effort is normal.   Abdominal:      General: Abdomen is flat. Bowel sounds are normal.      Palpations: Abdomen is soft.   Musculoskeletal:         General: Normal range of motion.   Skin:     General: Skin is warm.      Capillary Refill: Capillary refill takes less than 2 seconds.   Neurological:      Mental Status: She is alert and oriented to person, place, and time.   Psychiatric:         Mood and Affect: Mood normal.       MRI BRAIN W/O 11/30/22  1. MRI of the brain without contrast within normal limits except for a small punctate area of T2 hyperintensity in the left parietal white matter likely representing nonspecific foci of gliosis..  2. No evidence of mass lesion, heterotopic gray matter, gross cortical dysplasia, or mesial temporal sclerosis.      FOLLOW UP ITEMS POST DISCHARGE  -Follow-up with neurology  -Follow-up with primary care doctor within 2 weeks of discharge.     DISCHARGE DIAGNOSES  Principal Problem:    Seizure (HCC) POA: Yes  Active Problems:    Mild intermittent asthma without complication POA: Yes    Alcohol abuse POA: Yes    Gastritis POA: Unknown    COVID-19 virus infection POA: Unknown  Resolved Problems:    * No resolved hospital problems. *      FOLLOW UP  No future appointments.  FATOUMATA Su  [address]  [facility] [address]  [phone]    Follow up      Kenny Barrera M.D.  [address] [facility] [address] [phone]    Follow up        MEDICATIONS ON DISCHARGE     Medication List        START taking these medications        Instructions   folic acid 1 MG Tabs  Commonly  known as: FOLVITE   Take 1 Tablet by mouth every day.  Dose: 1 mg     guaiFENesin dextromethorphan 100-10 MG/5ML Syrp syrup  Commonly known as: ROBITUSSIN DM   Take 10 mL by mouth every 6 hours as needed for Cough.  Dose: 10 mL     levetiracetam 1000 MG tablet  Commonly known as: KEPPRA   Take 1 Tablet by mouth 2 times a day.  Dose: 1,000 mg     thiamine 100 MG tablet  Start taking on: December 3, 2022  Commonly known as: THIAMINE   Take 2 Tablets by mouth every day.  Dose: 200 mg              Allergies  Allergies   Allergen Reactions    Pcn [Penicillins] Unspecified     Childhood  Unsure of reaction       DIET  Orders Placed This Encounter   Procedures    Diet Order Diet: Regular     Standing Status:   Standing     Number of Occurrences:   1     Order Specific Question:   Diet:     Answer:   Regular [1]       ACTIVITY  As tolerated.  Weight bearing as tolerated    CONSULTATIONS  Neurology, Dr. Kenny CAROLINA Ke     PROCEDURES  EEG     LABORATORY  Lab Results   Component Value Date    SODIUM 137 12/02/2022    POTASSIUM 4.2 12/02/2022    CHLORIDE 101 12/02/2022    CO2 23 12/02/2022    GLUCOSE 90 12/02/2022    BUN 12 12/02/2022    CREATININE 0.62 12/02/2022        Lab Results   Component Value Date    WBC 10.7 12/02/2022    HEMOGLOBIN 15.9 12/02/2022    HEMATOCRIT 48.7 (H) 12/02/2022    PLATELETCT 147 (L) 12/02/2022        Total time of the discharge process exceeds 32 minutes.

## 2023-07-07 ENCOUNTER — APPOINTMENT (OUTPATIENT)
Dept: NEUROLOGY | Facility: MEDICAL CENTER | Age: 28
End: 2023-07-07
Attending: STUDENT IN AN ORGANIZED HEALTH CARE EDUCATION/TRAINING PROGRAM
Payer: COMMERCIAL

## 2023-08-17 ENCOUNTER — OFFICE VISIT (OUTPATIENT)
Dept: URGENT CARE | Facility: CLINIC | Age: 28
End: 2023-08-17
Payer: COMMERCIAL

## 2023-08-17 ENCOUNTER — APPOINTMENT (OUTPATIENT)
Dept: RADIOLOGY | Facility: MEDICAL CENTER | Age: 28
DRG: 417 | End: 2023-08-17
Attending: EMERGENCY MEDICINE
Payer: COMMERCIAL

## 2023-08-17 ENCOUNTER — HOSPITAL ENCOUNTER (INPATIENT)
Facility: MEDICAL CENTER | Age: 28
LOS: 5 days | DRG: 417 | End: 2023-08-22
Attending: EMERGENCY MEDICINE | Admitting: SURGERY
Payer: COMMERCIAL

## 2023-08-17 VITALS
TEMPERATURE: 98.8 F | BODY MASS INDEX: 26.49 KG/M2 | RESPIRATION RATE: 19 BRPM | SYSTOLIC BLOOD PRESSURE: 104 MMHG | HEIGHT: 65 IN | OXYGEN SATURATION: 100 % | DIASTOLIC BLOOD PRESSURE: 62 MMHG | WEIGHT: 159 LBS | HEART RATE: 107 BPM

## 2023-08-17 DIAGNOSIS — K80.50 CHOLEDOCHOLITHIASIS: ICD-10-CM

## 2023-08-17 DIAGNOSIS — R10.11 RIGHT UPPER QUADRANT ABDOMINAL PAIN: ICD-10-CM

## 2023-08-17 DIAGNOSIS — K81.0 ACUTE CHOLECYSTITIS: ICD-10-CM

## 2023-08-17 DIAGNOSIS — R10.31 RIGHT LOWER QUADRANT ABDOMINAL PAIN: ICD-10-CM

## 2023-08-17 LAB
ALBUMIN SERPL BCP-MCNC: 4.8 G/DL (ref 3.2–4.9)
ALBUMIN/GLOB SERPL: 1.7 G/DL
ALP SERPL-CCNC: 178 U/L (ref 30–99)
ALT SERPL-CCNC: 685 U/L (ref 2–50)
ANION GAP SERPL CALC-SCNC: 17 MMOL/L (ref 7–16)
APPEARANCE UR: CLEAR
AST SERPL-CCNC: 182 U/L (ref 12–45)
BASOPHILS # BLD AUTO: 0.2 % (ref 0–1.8)
BASOPHILS # BLD: 0.04 K/UL (ref 0–0.12)
BILIRUB SERPL-MCNC: 1.9 MG/DL (ref 0.1–1.5)
BILIRUB UR QL STRIP.AUTO: ABNORMAL
BUN SERPL-MCNC: 12 MG/DL (ref 8–22)
CALCIUM ALBUM COR SERPL-MCNC: 8.8 MG/DL (ref 8.5–10.5)
CALCIUM SERPL-MCNC: 9.4 MG/DL (ref 8.5–10.5)
CHLORIDE SERPL-SCNC: 101 MMOL/L (ref 96–112)
CO2 SERPL-SCNC: 18 MMOL/L (ref 20–33)
COLOR UR: ABNORMAL
CREAT SERPL-MCNC: 0.55 MG/DL (ref 0.5–1.4)
EOSINOPHIL # BLD AUTO: 0.02 K/UL (ref 0–0.51)
EOSINOPHIL NFR BLD: 0.1 % (ref 0–6.9)
ERYTHROCYTE [DISTWIDTH] IN BLOOD BY AUTOMATED COUNT: 40.9 FL (ref 35.9–50)
GFR SERPLBLD CREATININE-BSD FMLA CKD-EPI: 127 ML/MIN/1.73 M 2
GLOBULIN SER CALC-MCNC: 2.9 G/DL (ref 1.9–3.5)
GLUCOSE BLD STRIP.AUTO-MCNC: 132 MG/DL (ref 65–99)
GLUCOSE SERPL-MCNC: 126 MG/DL (ref 65–99)
GLUCOSE UR STRIP.AUTO-MCNC: NEGATIVE MG/DL
HCG SERPL QL: NEGATIVE
HCT VFR BLD AUTO: 48.9 % (ref 37–47)
HGB BLD-MCNC: 16.3 G/DL (ref 12–16)
IMM GRANULOCYTES # BLD AUTO: 0.15 K/UL (ref 0–0.11)
IMM GRANULOCYTES NFR BLD AUTO: 0.6 % (ref 0–0.9)
KETONES UR STRIP.AUTO-MCNC: 40 MG/DL
LACTATE SERPL-SCNC: 1.1 MMOL/L (ref 0.5–2)
LEUKOCYTE ESTERASE UR QL STRIP.AUTO: NEGATIVE
LIPASE SERPL-CCNC: 33 U/L (ref 11–82)
LYMPHOCYTES # BLD AUTO: 0.82 K/UL (ref 1–4.8)
LYMPHOCYTES NFR BLD: 3.2 % (ref 22–41)
MCH RBC QN AUTO: 29.1 PG (ref 27–33)
MCHC RBC AUTO-ENTMCNC: 33.3 G/DL (ref 32.2–35.5)
MCV RBC AUTO: 87.2 FL (ref 81.4–97.8)
MICRO URNS: ABNORMAL
MONOCYTES # BLD AUTO: 0.96 K/UL (ref 0–0.85)
MONOCYTES NFR BLD AUTO: 3.8 % (ref 0–13.4)
NEUTROPHILS # BLD AUTO: 23.55 K/UL (ref 1.82–7.42)
NEUTROPHILS NFR BLD: 92.1 % (ref 44–72)
NITRITE UR QL STRIP.AUTO: NEGATIVE
NRBC # BLD AUTO: 0 K/UL
NRBC BLD-RTO: 0 /100 WBC (ref 0–0.2)
PH UR STRIP.AUTO: 5.5 [PH] (ref 5–8)
PLATELET # BLD AUTO: 344 K/UL (ref 164–446)
PMV BLD AUTO: 9.5 FL (ref 9–12.9)
POTASSIUM SERPL-SCNC: 4.1 MMOL/L (ref 3.6–5.5)
PROT SERPL-MCNC: 7.7 G/DL (ref 6–8.2)
PROT UR QL STRIP: NEGATIVE MG/DL
RBC # BLD AUTO: 5.61 M/UL (ref 4.2–5.4)
RBC UR QL AUTO: NEGATIVE
SODIUM SERPL-SCNC: 136 MMOL/L (ref 135–145)
SP GR UR STRIP.AUTO: >=1.045
UROBILINOGEN UR STRIP.AUTO-MCNC: 0.2 MG/DL
WBC # BLD AUTO: 25.5 K/UL (ref 4.8–10.8)

## 2023-08-17 PROCEDURE — 74177 CT ABD & PELVIS W/CONTRAST: CPT

## 2023-08-17 PROCEDURE — 99223 1ST HOSP IP/OBS HIGH 75: CPT | Mod: 57 | Performed by: SURGERY

## 2023-08-17 PROCEDURE — 36415 COLL VENOUS BLD VENIPUNCTURE: CPT

## 2023-08-17 PROCEDURE — 99214 OFFICE O/P EST MOD 30 MIN: CPT | Performed by: PHYSICIAN ASSISTANT

## 2023-08-17 PROCEDURE — 87040 BLOOD CULTURE FOR BACTERIA: CPT

## 2023-08-17 PROCEDURE — 700105 HCHG RX REV CODE 258: Performed by: EMERGENCY MEDICINE

## 2023-08-17 PROCEDURE — 80053 COMPREHEN METABOLIC PANEL: CPT

## 2023-08-17 PROCEDURE — 99291 CRITICAL CARE FIRST HOUR: CPT

## 2023-08-17 PROCEDURE — 96375 TX/PRO/DX INJ NEW DRUG ADDON: CPT

## 2023-08-17 PROCEDURE — 3078F DIAST BP <80 MM HG: CPT | Performed by: PHYSICIAN ASSISTANT

## 2023-08-17 PROCEDURE — 81003 URINALYSIS AUTO W/O SCOPE: CPT

## 2023-08-17 PROCEDURE — 82962 GLUCOSE BLOOD TEST: CPT

## 2023-08-17 PROCEDURE — 700105 HCHG RX REV CODE 258: Mod: JZ | Performed by: SURGERY

## 2023-08-17 PROCEDURE — 84703 CHORIONIC GONADOTROPIN ASSAY: CPT

## 2023-08-17 PROCEDURE — 83605 ASSAY OF LACTIC ACID: CPT

## 2023-08-17 PROCEDURE — 76705 ECHO EXAM OF ABDOMEN: CPT

## 2023-08-17 PROCEDURE — 700111 HCHG RX REV CODE 636 W/ 250 OVERRIDE (IP): Performed by: SURGERY

## 2023-08-17 PROCEDURE — 700111 HCHG RX REV CODE 636 W/ 250 OVERRIDE (IP): Performed by: EMERGENCY MEDICINE

## 2023-08-17 PROCEDURE — 700117 HCHG RX CONTRAST REV CODE 255: Performed by: EMERGENCY MEDICINE

## 2023-08-17 PROCEDURE — 770001 HCHG ROOM/CARE - MED/SURG/GYN PRIV*

## 2023-08-17 PROCEDURE — 83690 ASSAY OF LIPASE: CPT

## 2023-08-17 PROCEDURE — 3074F SYST BP LT 130 MM HG: CPT | Performed by: PHYSICIAN ASSISTANT

## 2023-08-17 PROCEDURE — 96376 TX/PRO/DX INJ SAME DRUG ADON: CPT

## 2023-08-17 PROCEDURE — 96365 THER/PROPH/DIAG IV INF INIT: CPT

## 2023-08-17 PROCEDURE — 74181 MRI ABDOMEN W/O CONTRAST: CPT

## 2023-08-17 PROCEDURE — 85025 COMPLETE CBC W/AUTO DIFF WBC: CPT

## 2023-08-17 RX ORDER — HYDROMORPHONE HYDROCHLORIDE 1 MG/ML
0.5 INJECTION, SOLUTION INTRAMUSCULAR; INTRAVENOUS; SUBCUTANEOUS ONCE
Status: COMPLETED | OUTPATIENT
Start: 2023-08-17 | End: 2023-08-17

## 2023-08-17 RX ORDER — SODIUM CHLORIDE, SODIUM LACTATE, POTASSIUM CHLORIDE, AND CALCIUM CHLORIDE .6; .31; .03; .02 G/100ML; G/100ML; G/100ML; G/100ML
1000 INJECTION, SOLUTION INTRAVENOUS ONCE
Status: COMPLETED | OUTPATIENT
Start: 2023-08-17 | End: 2023-08-17

## 2023-08-17 RX ORDER — ONDANSETRON 2 MG/ML
4 INJECTION INTRAMUSCULAR; INTRAVENOUS EVERY 4 HOURS PRN
Status: DISCONTINUED | OUTPATIENT
Start: 2023-08-17 | End: 2023-08-22 | Stop reason: HOSPADM

## 2023-08-17 RX ORDER — SODIUM CHLORIDE, SODIUM LACTATE, POTASSIUM CHLORIDE, CALCIUM CHLORIDE 600; 310; 30; 20 MG/100ML; MG/100ML; MG/100ML; MG/100ML
INJECTION, SOLUTION INTRAVENOUS ONCE
Status: COMPLETED | OUTPATIENT
Start: 2023-08-17 | End: 2023-08-17

## 2023-08-17 RX ORDER — PROCHLORPERAZINE EDISYLATE 5 MG/ML
10 INJECTION INTRAMUSCULAR; INTRAVENOUS EVERY 6 HOURS PRN
Status: DISCONTINUED | OUTPATIENT
Start: 2023-08-17 | End: 2023-08-22 | Stop reason: HOSPADM

## 2023-08-17 RX ORDER — OXYCODONE HYDROCHLORIDE 5 MG/1
5 TABLET ORAL
Status: DISCONTINUED | OUTPATIENT
Start: 2023-08-17 | End: 2023-08-17

## 2023-08-17 RX ORDER — OXYCODONE HYDROCHLORIDE 10 MG/1
10 TABLET ORAL
Status: DISCONTINUED | OUTPATIENT
Start: 2023-08-17 | End: 2023-08-17

## 2023-08-17 RX ORDER — HYDROMORPHONE HYDROCHLORIDE 1 MG/ML
.5-1 INJECTION, SOLUTION INTRAMUSCULAR; INTRAVENOUS; SUBCUTANEOUS
Status: DISCONTINUED | OUTPATIENT
Start: 2023-08-17 | End: 2023-08-17

## 2023-08-17 RX ORDER — ONDANSETRON 4 MG/1
4 TABLET, ORALLY DISINTEGRATING ORAL EVERY 4 HOURS PRN
Status: DISCONTINUED | OUTPATIENT
Start: 2023-08-17 | End: 2023-08-22 | Stop reason: HOSPADM

## 2023-08-17 RX ORDER — KETOROLAC TROMETHAMINE 30 MG/ML
30 INJECTION, SOLUTION INTRAMUSCULAR; INTRAVENOUS ONCE
Status: COMPLETED | OUTPATIENT
Start: 2023-08-17 | End: 2023-08-17

## 2023-08-17 RX ORDER — METRONIDAZOLE 500 MG/100ML
500 INJECTION, SOLUTION INTRAVENOUS ONCE
Status: COMPLETED | OUTPATIENT
Start: 2023-08-17 | End: 2023-08-17

## 2023-08-17 RX ORDER — HYDROMORPHONE HYDROCHLORIDE 1 MG/ML
.5-1 INJECTION, SOLUTION INTRAMUSCULAR; INTRAVENOUS; SUBCUTANEOUS
Status: DISCONTINUED | OUTPATIENT
Start: 2023-08-17 | End: 2023-08-18

## 2023-08-17 RX ORDER — M-VIT,TX,IRON,MINS/CALC/FOLIC 27MG-0.4MG
1 TABLET ORAL DAILY
COMMUNITY

## 2023-08-17 RX ORDER — CEFTRIAXONE 2 G/1
2000 INJECTION, POWDER, FOR SOLUTION INTRAMUSCULAR; INTRAVENOUS ONCE
Status: COMPLETED | OUTPATIENT
Start: 2023-08-17 | End: 2023-08-17

## 2023-08-17 RX ORDER — ONDANSETRON 2 MG/ML
4 INJECTION INTRAMUSCULAR; INTRAVENOUS ONCE
Status: COMPLETED | OUTPATIENT
Start: 2023-08-17 | End: 2023-08-17

## 2023-08-17 RX ADMIN — ONDANSETRON 4 MG: 2 INJECTION INTRAMUSCULAR; INTRAVENOUS at 13:54

## 2023-08-17 RX ADMIN — SODIUM CHLORIDE, POTASSIUM CHLORIDE, SODIUM LACTATE AND CALCIUM CHLORIDE 1000 ML: 600; 310; 30; 20 INJECTION, SOLUTION INTRAVENOUS at 13:49

## 2023-08-17 RX ADMIN — SODIUM CHLORIDE, POTASSIUM CHLORIDE, SODIUM LACTATE AND CALCIUM CHLORIDE: 600; 310; 30; 20 INJECTION, SOLUTION INTRAVENOUS at 16:33

## 2023-08-17 RX ADMIN — ONDANSETRON 4 MG: 4 TABLET, ORALLY DISINTEGRATING ORAL at 20:21

## 2023-08-17 RX ADMIN — HYDROMORPHONE HYDROCHLORIDE 0.5 MG: 1 INJECTION, SOLUTION INTRAMUSCULAR; INTRAVENOUS; SUBCUTANEOUS at 16:32

## 2023-08-17 RX ADMIN — HYDROMORPHONE HYDROCHLORIDE 1 MG: 1 INJECTION, SOLUTION INTRAMUSCULAR; INTRAVENOUS; SUBCUTANEOUS at 22:01

## 2023-08-17 RX ADMIN — MORPHINE SULFATE 6 MG: 10 INJECTION INTRAVENOUS at 13:55

## 2023-08-17 RX ADMIN — METRONIDAZOLE 500 MG: 500 INJECTION, SOLUTION INTRAVENOUS at 15:43

## 2023-08-17 RX ADMIN — HYDROMORPHONE HYDROCHLORIDE 0.5 MG: 1 INJECTION, SOLUTION INTRAMUSCULAR; INTRAVENOUS; SUBCUTANEOUS at 15:08

## 2023-08-17 RX ADMIN — CEFTRIAXONE SODIUM 2000 MG: 2 INJECTION, POWDER, FOR SOLUTION INTRAMUSCULAR; INTRAVENOUS at 15:43

## 2023-08-17 RX ADMIN — HYDROMORPHONE HYDROCHLORIDE 0.5 MG: 1 INJECTION, SOLUTION INTRAMUSCULAR; INTRAVENOUS; SUBCUTANEOUS at 19:28

## 2023-08-17 RX ADMIN — KETOROLAC TROMETHAMINE 30 MG: 30 INJECTION, SOLUTION INTRAMUSCULAR; INTRAVENOUS at 11:29

## 2023-08-17 RX ADMIN — IOHEXOL 100 ML: 350 INJECTION, SOLUTION INTRAVENOUS at 17:20

## 2023-08-17 ASSESSMENT — FIBROSIS 4 INDEX
FIB4 SCORE: 0.94
FIB4 SCORE: 0.94

## 2023-08-17 ASSESSMENT — ENCOUNTER SYMPTOMS
BLOOD IN STOOL: 0
ABDOMINAL PAIN: 1
CHILLS: 1
DIARRHEA: 0
FLANK PAIN: 0
CONSTIPATION: 0
MYALGIAS: 1
NAUSEA: 1
FEVER: 0
VOMITING: 1
ROS GI COMMENTS: 1

## 2023-08-17 NOTE — PROGRESS NOTES
Subjective:     Mariia Samayoa  is a 27 y.o. female who presents for GI Problem (X 5 DAYS, URQ PAIN, VOMITING, PAIN WITH SITTING AND WALKING, SHARP HOT STABBING PAIN )      GI Problem  This is a new problem. The current episode started in the past 7 days. Associated symptoms include abdominal pain, chills, myalgias, nausea and vomiting. Pertinent negatives include no fever.   Patient presents urgent care noting last 3 to 4 days of upper abdominal pain focused on right side.  She thought it may be associated with stomach ulcers and has been taking over-the-counter heartburn medications.  She awoke this morning with severe pain on right side of abdomen.  She describes pain to right upper and right lower quadrant of abdomen.  She states pain is severe.  She states all pushing on abdomen radiates pain to right side.  She denies fevers but has had cold sweats.  She notes nausea with few episodes of vomiting.  She denies diarrhea constipation blood per stool or melena.  She denies a history of abdominal surgeries.  She denies dysuria frequency urgency or hematuria.  Patient states pain is severe and requests something for pain management as immediately as possible.    Review of Systems   Constitutional:  Positive for chills. Negative for fever.   Gastrointestinal:  Positive for abdominal pain, nausea and vomiting. Negative for blood in stool, constipation, diarrhea and melena.        1   Genitourinary:  Negative for dysuria, flank pain, frequency, hematuria and urgency.   Musculoskeletal:  Positive for myalgias.       Medications:    folic acid Tabs  guaiFENesin dextromethorphan Syrp  levetiracetam  thiamine    Allergies: Pcn [penicillins]    Problem List: Mariia Samayoa does not have any pertinent problems on file.    Surgical History:  No past surgical history on file.    Past Social Hx: Mariia Samayoa  reports that she has quit smoking. Her smoking use included cigarettes. She has never used smokeless tobacco. She  "reports current alcohol use of about 6.0 oz of alcohol per week. She reports that she does not currently use drugs after having used the following drugs: Inhaled and Marijuana.     Past Family Hx:  Mariia Samayoa family history includes Cancer in her mother; Hypertension in her maternal grandmother.     Problem list, medications, and allergies reviewed by myself today in Epic.     Objective:   /62   Pulse (!) 107   Temp 37.1 °C (98.8 °F) (Temporal)   Resp 19   Ht 1.651 m (5' 5\")   Wt 72.1 kg (159 lb)   SpO2 100%   BMI 26.46 kg/m²     Physical Exam  Vitals and nursing note reviewed.   Constitutional:       General: She is not in acute distress.     Appearance: She is well-developed. She is not diaphoretic.   HENT:      Head: Normocephalic and atraumatic.      Right Ear: Tympanic membrane, ear canal and external ear normal.      Left Ear: Tympanic membrane, ear canal and external ear normal.      Nose: Nose normal.      Mouth/Throat:      Mouth: Mucous membranes are moist.      Pharynx: Uvula midline. Posterior oropharyngeal erythema ( mild PND) present. No oropharyngeal exudate.      Tonsils: No tonsillar abscesses.   Eyes:      General: Lids are normal. No scleral icterus.        Right eye: No discharge.         Left eye: No discharge.      Conjunctiva/sclera: Conjunctivae normal.   Pulmonary:      Effort: Pulmonary effort is normal. No respiratory distress.      Breath sounds: Normal breath sounds. No stridor. No decreased breath sounds, wheezing, rhonchi or rales.   Abdominal:      General: There is no distension.      Palpations: Abdomen is soft.      Tenderness: There is abdominal tenderness. There is right CVA tenderness, guarding and rebound. There is no left CVA tenderness. Positive signs include Nath's sign and McBurney's sign.      Hernia: No hernia is present.      Comments: Guarding on right upper as well as right lower abdomen, positive rebound, CVAT on right side   Musculoskeletal:       "   General: Normal range of motion.      Cervical back: Neck supple.   Skin:     General: Skin is warm and dry.      Coloration: Skin is not pale.      Findings: No erythema.   Neurological:      Mental Status: She is alert and oriented to person, place, and time. She is not disoriented.   Psychiatric:         Speech: Speech normal.         Behavior: Behavior normal.     Toradol 30 mg IM-tolerates well    Assessment/Plan:   Assessment      1. Right lower quadrant abdominal pain  - ketorolac (Toradol) injection 30 mg    2. Right upper quadrant abdominal pain  - ketorolac (Toradol) injection 30 mg      Patient has been directed to [facility] ER for further management/work up now, today.  Patient has been directed to emergency department for higher level of work-up and care now today.  She is given Toradol intramuscularly before leaving urgent care.  She is instructed to stay n.p.o. until further evaluation.  Patient declines EMS transport.    I have worn an N95 mask, gloves and eye protection for the entire encounter with this patient.     Differential diagnosis, natural history, supportive care, and indications for immediate follow-up discussed.

## 2023-08-17 NOTE — ED TRIAGE NOTES
Mariiatyesha Collierin  27 y.o. female  Chief Complaint   Patient presents with    Abdominal Pain     10/10 RLQ and epigastric pain, onset Saturday. Vomited several times since yesterday. Denies GI hx, BM and urination WNL       Pt ambulatory to triage with steady gait for above complaint. FSBG 132.    Pt is GCS 15, speaking in full sentences, follows commands and responds appropriately to questions. Resp are rapid, even and unlabored.    Abdominal pain protocol ordered. Pt placed in draw. Pt educated on triage process. Pt encouraged to alert staff for any changes.       Vitals:    08/17/23 1151   BP: (!) 133/90   Pulse: 99   Resp: (!) 24   Temp:    SpO2: 98%

## 2023-08-17 NOTE — ED NOTES
Break RN, pt medicated with ordered morphine, zofran, phlebotomist at bedside drawing blood cultures

## 2023-08-17 NOTE — ED PROVIDER NOTES
ED Provider Note    CHIEF COMPLAINT  Chief Complaint   Patient presents with    Abdominal Pain     10/10 RLQ and epigastric pain, onset Saturday. Vomited several times since yesterday. Denies GI hx, BM and urination WNL     EXTERNAL RECORDS REVIEWED  Record review show that the patient was last admitted November of 2022 for a seizure. She has a history of seizure and alcohol use. The patient has a past history of alcohol withdrawal.     HPI/ROS  LIMITATION TO HISTORY   Select: : None  OUTSIDE HISTORIAN(S):  Family at bedside    Mariia Samayoa is a 27 y.o. female who presents to the Emergency Department with abdominal pain onset 5 days ago. The patient explained that it started on the left side but has since moved to the opposite side. She experiences some chest pain, shortness of breath chills, nausea and vomiting. She denies any diarrhea, fever, or dysuria. The patient is currently 6 months sober from alcohol and notes some ocassional marijuana use.  No history of prior abdominal surgeries.      PAST MEDICAL HISTORY  Past Medical History:   Diagnosis Date    Anxiety     ASTHMA     exercise induced    Depression     EtOH dependence (HCC)         SURGICAL HISTORY  History reviewed. No pertinent surgical history.     FAMILY HISTORY  Family History   Problem Relation Age of Onset    Cancer Mother     Hypertension Maternal Grandmother        SOCIAL HISTORY   reports that she has quit smoking. Her smoking use included cigarettes. She has never used smokeless tobacco. She reports that she does not currently use alcohol after a past usage of about 6.0 oz of alcohol per week. She reports current drug use. Drugs: Inhaled and Marijuana.    CURRENT MEDICATIONS  Previous Medications    FOLIC ACID (FOLVITE) 1 MG TAB    Take 1 Tablet by mouth every day.    GUAIFENESIN DEXTROMETHORPHAN (ROBITUSSIN DM) 100-10 MG/5ML SYRUP SYRUP    Take 10 mL by mouth every 6 hours as needed for Cough.    LEVETIRACETAM (KEPPRA) 1000 MG TABLET     "Take 1 Tablet by mouth 2 times a day.    THIAMINE (THIAMINE) 100 MG TABLET    Take 2 Tablets by mouth every day.       ALLERGIES  Pcn [penicillins]    PHYSICAL EXAM  /80   Pulse 77   Temp 36.4 °C (97.5 °F) (Temporal)   Resp (!) 24   Ht 1.651 m (5' 5\")   Wt 73 kg (160 lb 15 oz)   SpO2 98%      Constitutional: Nontoxic appearing. Alert in no apparent distress, Uncomfortable appearing  HENT: Normocephalic, Atraumatic. Bilateral external ears normal. Nose normal.  Moist mucous membranes.  Oropharynx clear.  Eyes: Pupils are equal and reactive. Conjunctiva normal.   Neck: Supple, full range of motion  Heart: Regular rate and rhythm.  No murmurs.    Lungs: No respiratory distress, normal work of breathing. Lungs clear to auscultation bilaterally.  Abdomen Soft, no distention, Diffuse abdominal tenderness with voluntary guarding worse in the right upper quadrant     Musculoskeletal: Atraumatic. No obvious deformities noted.  No lower extremity edema.  Skin: Warm, Dry.  No erythema, No rash.   Neurologic: Alert and oriented x3. Moving all extremities spontaneously without focal deficits.  Psychiatric: Affect normal, Mood normal, Appears appropriate and not intoxicated.       DIAGNOSTIC STUDIES / PROCEDURES    LABS  Labs Reviewed   CBC WITH DIFFERENTIAL - Abnormal; Notable for the following components:       Result Value    WBC 25.5 (*)     RBC 5.61 (*)     Hemoglobin 16.3 (*)     Hematocrit 48.9 (*)     Neutrophils-Polys 92.10 (*)     Lymphocytes 3.20 (*)     Neutrophils (Absolute) 23.55 (*)     Lymphs (Absolute) 0.82 (*)     Monos (Absolute) 0.96 (*)     Immature Granulocytes (abs) 0.15 (*)     All other components within normal limits   COMP METABOLIC PANEL - Abnormal; Notable for the following components:    Co2 18 (*)     Anion Gap 17.0 (*)     Glucose 126 (*)     AST(SGOT) 182 (*)     ALT(SGPT) 685 (*)     Alkaline Phosphatase 178 (*)     Total Bilirubin 1.9 (*)     All other components within normal limits " "  URINALYSIS,CULTURE IF INDICATED - Abnormal; Notable for the following components:    Specific Gravity >=1.045 (*)     Ketones 40 (*)     Bilirubin Small (*)     All other components within normal limits    Narrative:     Indication for culture:->Patient WITHOUT an indwelling Murray  catheter in place with new onset of Dysuria, Frequency,  Urgency, and/or Suprapubic pain   POCT GLUCOSE DEVICE RESULTS - Abnormal; Notable for the following components:    POC Glucose, Blood 132 (*)     All other components within normal limits   LIPASE   HCG QUAL SERUM   ESTIMATED GFR   LACTIC ACID   BLOOD CULTURE    Narrative:     1 of 2 for Blood Culture x 2 sites order. Per Hospital  Policy: Only change Specimen Src: to \"Line\" if specified by  physician order.   BLOOD CULTURE    Narrative:     2 of 2 blood culture x2  Sites order. Per Hospital Policy:  Only change Specimen Src: to \"Line\" if specified by physician  order.        RADIOLOGY  I have independently interpreted the diagnostic imaging associated with this visit and am waiting the final reading from the radiologist.   My preliminary interpretation is a follows: acute cholecystitis  Radiologist interpretation:   US-RUQ   Final Result      1.  Distended gallbladder with wall thickening, gallbladder sludge and trace pericholecystic fluid is suspicious for acute cholecystitis.   2.  There is mild biliary dilatation.      CT-ABDOMEN-PELVIS WITH   Final Result      1.  Findings suspicious for acute cholecystitis though no radiodense stones are seen within the gallbladder   2.  Coarse calcifications in the pancreas, likely the residua of chronic pancreatitis   3.  Possible 5 mm choledocholith though this could be pancreatic parenchymal calcification. Further assessment might be performed with MRCP.   4.  Dilation of the pancreatic duct could be related to chronic pancreatitis however ductal obstruction from stone is not excluded.   5.  Small amount of fluid in the RIGHT paracolic " gutter   6.  Changes in the ascending colon are likely reactive to adjacent inflammatory changes or artifactual and related to segmental underdistention rather than related to colitis from other cause      [id number]    (Results Pending)        COURSE & MEDICAL DECISION MAKING  1:48 PM - Patient seen and examined at bedside. Discussed plan of care, including a diagnostic wok up. Patient agrees to the plan of care. The patient will be resuscitated with lactated ringers infusion and medicated with Morphine 6 mg 10 mg/mL injection and Zofran 4 mg injection . Ordered for US-RUQ, CT-Abdomen-Pelvis w/,  Latic acid, Blood culture x2, Estimated GFR, CBC w/ Diff, CMP, Lipase, HCG Qual Serum and UA to evaluate her symptoms.      ED Observation Status? Yes; I am placing the patient in to an observation status due to a diagnostic uncertainty as well as therapeutic intensity. Patient placed in observation status at 1:48 PM, 8/17/2023.     Observation plan is as follows: labs, imaging, pain medications    3:10 PM - Patient will be medicated with Flagyl IVPB 500 mg, Rocephin 2,000 mg injection, Dilaudid 0.5 mg injection       Upon Reevaluation, the patient's condition has: not improved; and will be escalated to hospitalization.    Patient discharged from ED Observation status at 3:15 (Time) 8/17/2023  (Date).     INITIAL ASSESSMENT, COURSE AND PLAN  Care Narrative: Young relatively healthy patient presents with few day history of ongoing abdominal pain.  She is afebrile with reassuring vital signs on arrival however very uncomfortable appearing with a concerning exam.  Her labs returned with a leukocytosis of 25 however normal lactate.  She has transaminitis and elevated bilirubin as well to suggest biliary pathology.  Lipase is normal without pancreatitis.  Imaging shows evidence of acute cholecystitis with associated mild biliary dilation that could be concerning for choledocholithiasis.  Patient was given fluids and  antibiotics and will consult with general surgery.    3:26 PM - Patient was reevaluated at bedside.  Vital signs are stable however she is still complaining of a lot of pain.  Discussed lab and radiology  results with the patient and informed them of my plan for admission and likely surgery.  Patient is agreeable.    ADDITIONAL PROBLEM LIST  Problem #1: Acute cholecystitis -given antibiotics, general surgery consulted    Problem #2: Choledocholithiasis -plan for MRCP and GI consultation prior to surgery        DISPOSITION AND DISCUSSIONS  I have discussed management of the patient with the following physicians and MACHO's:    4:05 PM - Dr. Bryan (Surgery) who accepts admission of the patient.    4:15PM -I discussed the case with Kyleigh Jiménez, gastroenterology, who is recommending MRCP and they will evaluate the patient for endoscopy      DISPOSITION:  Patient will be hospitalized by Dr. Torres in guarded condition.     FINAL DIAGNOSIS  1. Acute cholecystitis    2. Choledocholithiasis        The note accurately reflects work and decisions made by me.  Alycia Arias M.D.  8/17/2023  9:29 PM     Madina GOMEZ (eDuce), am scribing for, and in the presence of, Alycia Arias M.D..    Electronically signed by: Madina Alatorre (Deuce), 8/17/2023    Alycia GOMEZ M.D. personally performed the services described in this documentation, as scribed by Madina Alatorre in my presence, and it is both accurate and complete.

## 2023-08-18 ENCOUNTER — ANESTHESIA (OUTPATIENT)
Dept: SURGERY | Facility: MEDICAL CENTER | Age: 28
DRG: 417 | End: 2023-08-18
Payer: COMMERCIAL

## 2023-08-18 ENCOUNTER — ANESTHESIA EVENT (OUTPATIENT)
Dept: SURGERY | Facility: MEDICAL CENTER | Age: 28
DRG: 417 | End: 2023-08-18
Payer: COMMERCIAL

## 2023-08-18 ENCOUNTER — APPOINTMENT (OUTPATIENT)
Dept: RADIOLOGY | Facility: MEDICAL CENTER | Age: 28
DRG: 417 | End: 2023-08-18
Attending: SURGERY
Payer: COMMERCIAL

## 2023-08-18 LAB
ALBUMIN SERPL BCP-MCNC: 3.9 G/DL (ref 3.2–4.9)
ALBUMIN/GLOB SERPL: 1.7 G/DL
ALP SERPL-CCNC: 137 U/L (ref 30–99)
ALT SERPL-CCNC: 442 U/L (ref 2–50)
ANION GAP SERPL CALC-SCNC: 12 MMOL/L (ref 7–16)
AST SERPL-CCNC: 73 U/L (ref 12–45)
BASOPHILS # BLD AUTO: 0.1 % (ref 0–1.8)
BASOPHILS # BLD: 0.03 K/UL (ref 0–0.12)
BILIRUB SERPL-MCNC: 1.4 MG/DL (ref 0.1–1.5)
BUN SERPL-MCNC: 9 MG/DL (ref 8–22)
CALCIUM ALBUM COR SERPL-MCNC: 8.5 MG/DL (ref 8.5–10.5)
CALCIUM SERPL-MCNC: 8.4 MG/DL (ref 8.5–10.5)
CHLORIDE SERPL-SCNC: 102 MMOL/L (ref 96–112)
CO2 SERPL-SCNC: 23 MMOL/L (ref 20–33)
CREAT SERPL-MCNC: 0.55 MG/DL (ref 0.5–1.4)
EOSINOPHIL # BLD AUTO: 0.01 K/UL (ref 0–0.51)
EOSINOPHIL NFR BLD: 0 % (ref 0–6.9)
ERYTHROCYTE [DISTWIDTH] IN BLOOD BY AUTOMATED COUNT: 41.9 FL (ref 35.9–50)
GFR SERPLBLD CREATININE-BSD FMLA CKD-EPI: 127 ML/MIN/1.73 M 2
GLOBULIN SER CALC-MCNC: 2.3 G/DL (ref 1.9–3.5)
GLUCOSE SERPL-MCNC: 111 MG/DL (ref 65–99)
HCT VFR BLD AUTO: 44.7 % (ref 37–47)
HGB BLD-MCNC: 14.6 G/DL (ref 12–16)
IMM GRANULOCYTES # BLD AUTO: 0.11 K/UL (ref 0–0.11)
IMM GRANULOCYTES NFR BLD AUTO: 0.5 % (ref 0–0.9)
LYMPHOCYTES # BLD AUTO: 1.88 K/UL (ref 1–4.8)
LYMPHOCYTES NFR BLD: 8.9 % (ref 22–41)
MCH RBC QN AUTO: 28.9 PG (ref 27–33)
MCHC RBC AUTO-ENTMCNC: 32.7 G/DL (ref 32.2–35.5)
MCV RBC AUTO: 88.5 FL (ref 81.4–97.8)
MONOCYTES # BLD AUTO: 1.21 K/UL (ref 0–0.85)
MONOCYTES NFR BLD AUTO: 5.7 % (ref 0–13.4)
NEUTROPHILS # BLD AUTO: 18 K/UL (ref 1.82–7.42)
NEUTROPHILS NFR BLD: 84.8 % (ref 44–72)
NRBC # BLD AUTO: 0 K/UL
NRBC BLD-RTO: 0 /100 WBC (ref 0–0.2)
PATHOLOGY CONSULT NOTE: NORMAL
PLATELET # BLD AUTO: 336 K/UL (ref 164–446)
PMV BLD AUTO: 9.6 FL (ref 9–12.9)
POTASSIUM SERPL-SCNC: 3.9 MMOL/L (ref 3.6–5.5)
PROT SERPL-MCNC: 6.2 G/DL (ref 6–8.2)
RBC # BLD AUTO: 5.05 M/UL (ref 4.2–5.4)
SODIUM SERPL-SCNC: 137 MMOL/L (ref 135–145)
WBC # BLD AUTO: 21.2 K/UL (ref 4.8–10.8)

## 2023-08-18 PROCEDURE — 96375 TX/PRO/DX INJ NEW DRUG ADDON: CPT

## 2023-08-18 PROCEDURE — 160036 HCHG PACU - EA ADDL 30 MINS PHASE I: Performed by: SURGERY

## 2023-08-18 PROCEDURE — A9270 NON-COVERED ITEM OR SERVICE: HCPCS | Performed by: ANESTHESIOLOGY

## 2023-08-18 PROCEDURE — A9270 NON-COVERED ITEM OR SERVICE: HCPCS

## 2023-08-18 PROCEDURE — 700102 HCHG RX REV CODE 250 W/ 637 OVERRIDE(OP): Performed by: ANESTHESIOLOGY

## 2023-08-18 PROCEDURE — 160035 HCHG PACU - 1ST 60 MINS PHASE I: Performed by: SURGERY

## 2023-08-18 PROCEDURE — 160009 HCHG ANES TIME/MIN: Performed by: SURGERY

## 2023-08-18 PROCEDURE — 700111 HCHG RX REV CODE 636 W/ 250 OVERRIDE (IP): Mod: JZ | Performed by: ANESTHESIOLOGY

## 2023-08-18 PROCEDURE — 700101 HCHG RX REV CODE 250: Performed by: ANESTHESIOLOGY

## 2023-08-18 PROCEDURE — 47563 LAPARO CHOLECYSTECTOMY/GRAPH: CPT | Performed by: SURGERY

## 2023-08-18 PROCEDURE — 160048 HCHG OR STATISTICAL LEVEL 1-5: Performed by: SURGERY

## 2023-08-18 PROCEDURE — 74300 X-RAY BILE DUCTS/PANCREAS: CPT

## 2023-08-18 PROCEDURE — 85025 COMPLETE CBC W/AUTO DIFF WBC: CPT

## 2023-08-18 PROCEDURE — 0FT44ZZ RESECTION OF GALLBLADDER, PERCUTANEOUS ENDOSCOPIC APPROACH: ICD-10-PCS | Performed by: SURGERY

## 2023-08-18 PROCEDURE — 160002 HCHG RECOVERY MINUTES (STAT): Performed by: SURGERY

## 2023-08-18 PROCEDURE — 700111 HCHG RX REV CODE 636 W/ 250 OVERRIDE (IP): Performed by: SURGERY

## 2023-08-18 PROCEDURE — 160029 HCHG SURGERY MINUTES - 1ST 30 MINS LEVEL 4: Performed by: SURGERY

## 2023-08-18 PROCEDURE — 160041 HCHG SURGERY MINUTES - EA ADDL 1 MIN LEVEL 4: Performed by: SURGERY

## 2023-08-18 PROCEDURE — 80053 COMPREHEN METABOLIC PANEL: CPT

## 2023-08-18 PROCEDURE — 700101 HCHG RX REV CODE 250: Performed by: SURGERY

## 2023-08-18 PROCEDURE — 96376 TX/PRO/DX INJ SAME DRUG ADON: CPT

## 2023-08-18 PROCEDURE — 99222 1ST HOSP IP/OBS MODERATE 55: CPT | Mod: 57 | Performed by: SURGERY

## 2023-08-18 PROCEDURE — 99222 1ST HOSP IP/OBS MODERATE 55: CPT | Performed by: INTERNAL MEDICINE

## 2023-08-18 PROCEDURE — 88304 TISSUE EXAM BY PATHOLOGIST: CPT

## 2023-08-18 PROCEDURE — 700102 HCHG RX REV CODE 250 W/ 637 OVERRIDE(OP)

## 2023-08-18 PROCEDURE — 700111 HCHG RX REV CODE 636 W/ 250 OVERRIDE (IP)

## 2023-08-18 PROCEDURE — 770001 HCHG ROOM/CARE - MED/SURG/GYN PRIV*

## 2023-08-18 PROCEDURE — 700105 HCHG RX REV CODE 258: Mod: JZ | Performed by: ANESTHESIOLOGY

## 2023-08-18 PROCEDURE — 110371 HCHG SHELL REV 272: Performed by: SURGERY

## 2023-08-18 PROCEDURE — 36415 COLL VENOUS BLD VENIPUNCTURE: CPT

## 2023-08-18 RX ORDER — LIDOCAINE HYDROCHLORIDE 20 MG/ML
INJECTION, SOLUTION EPIDURAL; INFILTRATION; INTRACAUDAL; PERINEURAL PRN
Status: DISCONTINUED | OUTPATIENT
Start: 2023-08-18 | End: 2023-08-18 | Stop reason: SURG

## 2023-08-18 RX ORDER — SODIUM CHLORIDE, SODIUM LACTATE, POTASSIUM CHLORIDE, CALCIUM CHLORIDE 600; 310; 30; 20 MG/100ML; MG/100ML; MG/100ML; MG/100ML
INJECTION, SOLUTION INTRAVENOUS
Status: DISCONTINUED | OUTPATIENT
Start: 2023-08-18 | End: 2023-08-18 | Stop reason: SURG

## 2023-08-18 RX ORDER — MIDAZOLAM HYDROCHLORIDE 1 MG/ML
INJECTION INTRAMUSCULAR; INTRAVENOUS PRN
Status: DISCONTINUED | OUTPATIENT
Start: 2023-08-18 | End: 2023-08-18 | Stop reason: SURG

## 2023-08-18 RX ORDER — OXYCODONE HCL 5 MG/5 ML
5 SOLUTION, ORAL ORAL
Status: COMPLETED | OUTPATIENT
Start: 2023-08-18 | End: 2023-08-18

## 2023-08-18 RX ORDER — HYDROMORPHONE HYDROCHLORIDE 1 MG/ML
0.4 INJECTION, SOLUTION INTRAMUSCULAR; INTRAVENOUS; SUBCUTANEOUS
Status: DISCONTINUED | OUTPATIENT
Start: 2023-08-18 | End: 2023-08-18 | Stop reason: HOSPADM

## 2023-08-18 RX ORDER — OXYCODONE HYDROCHLORIDE 5 MG/1
5 TABLET ORAL
Status: DISCONTINUED | OUTPATIENT
Start: 2023-08-18 | End: 2023-08-22 | Stop reason: HOSPADM

## 2023-08-18 RX ORDER — HYDROMORPHONE HYDROCHLORIDE 1 MG/ML
0.1 INJECTION, SOLUTION INTRAMUSCULAR; INTRAVENOUS; SUBCUTANEOUS
Status: DISCONTINUED | OUTPATIENT
Start: 2023-08-18 | End: 2023-08-18 | Stop reason: HOSPADM

## 2023-08-18 RX ORDER — CEFAZOLIN SODIUM 1 G/3ML
INJECTION, POWDER, FOR SOLUTION INTRAMUSCULAR; INTRAVENOUS PRN
Status: DISCONTINUED | OUTPATIENT
Start: 2023-08-18 | End: 2023-08-18 | Stop reason: SURG

## 2023-08-18 RX ORDER — ONDANSETRON 2 MG/ML
INJECTION INTRAMUSCULAR; INTRAVENOUS PRN
Status: DISCONTINUED | OUTPATIENT
Start: 2023-08-18 | End: 2023-08-18 | Stop reason: SURG

## 2023-08-18 RX ORDER — HYDROMORPHONE HYDROCHLORIDE 1 MG/ML
1 INJECTION, SOLUTION INTRAMUSCULAR; INTRAVENOUS; SUBCUTANEOUS
Status: DISCONTINUED | OUTPATIENT
Start: 2023-08-18 | End: 2023-08-22 | Stop reason: HOSPADM

## 2023-08-18 RX ORDER — HALOPERIDOL 5 MG/ML
1 INJECTION INTRAMUSCULAR
Status: DISCONTINUED | OUTPATIENT
Start: 2023-08-18 | End: 2023-08-18 | Stop reason: HOSPADM

## 2023-08-18 RX ORDER — DIPHENHYDRAMINE HYDROCHLORIDE 50 MG/ML
12.5 INJECTION INTRAMUSCULAR; INTRAVENOUS
Status: DISCONTINUED | OUTPATIENT
Start: 2023-08-18 | End: 2023-08-18 | Stop reason: HOSPADM

## 2023-08-18 RX ORDER — POLYETHYLENE GLYCOL 3350 17 G/17G
1 POWDER, FOR SOLUTION ORAL
Status: DISCONTINUED | OUTPATIENT
Start: 2023-08-18 | End: 2023-08-22 | Stop reason: HOSPADM

## 2023-08-18 RX ORDER — ONDANSETRON 2 MG/ML
4 INJECTION INTRAMUSCULAR; INTRAVENOUS
Status: COMPLETED | OUTPATIENT
Start: 2023-08-18 | End: 2023-08-18

## 2023-08-18 RX ORDER — OXYCODONE HCL 5 MG/5 ML
10 SOLUTION, ORAL ORAL
Status: COMPLETED | OUTPATIENT
Start: 2023-08-18 | End: 2023-08-18

## 2023-08-18 RX ORDER — BUPIVACAINE HYDROCHLORIDE AND EPINEPHRINE 5; 5 MG/ML; UG/ML
INJECTION, SOLUTION EPIDURAL; INTRACAUDAL; PERINEURAL
Status: DISCONTINUED | OUTPATIENT
Start: 2023-08-18 | End: 2023-08-18 | Stop reason: HOSPADM

## 2023-08-18 RX ORDER — SODIUM CHLORIDE, SODIUM LACTATE, POTASSIUM CHLORIDE, CALCIUM CHLORIDE 600; 310; 30; 20 MG/100ML; MG/100ML; MG/100ML; MG/100ML
INJECTION, SOLUTION INTRAVENOUS CONTINUOUS
Status: DISCONTINUED | OUTPATIENT
Start: 2023-08-18 | End: 2023-08-18 | Stop reason: HOSPADM

## 2023-08-18 RX ORDER — MEPERIDINE HYDROCHLORIDE 25 MG/ML
6.25 INJECTION INTRAMUSCULAR; INTRAVENOUS; SUBCUTANEOUS
Status: DISCONTINUED | OUTPATIENT
Start: 2023-08-18 | End: 2023-08-18 | Stop reason: HOSPADM

## 2023-08-18 RX ORDER — ROCURONIUM BROMIDE 10 MG/ML
INJECTION, SOLUTION INTRAVENOUS PRN
Status: DISCONTINUED | OUTPATIENT
Start: 2023-08-18 | End: 2023-08-18 | Stop reason: HOSPADM

## 2023-08-18 RX ORDER — AMOXICILLIN 250 MG
2 CAPSULE ORAL 2 TIMES DAILY
Status: DISCONTINUED | OUTPATIENT
Start: 2023-08-18 | End: 2023-08-22 | Stop reason: HOSPADM

## 2023-08-18 RX ORDER — DEXAMETHASONE SODIUM PHOSPHATE 4 MG/ML
INJECTION, SOLUTION INTRA-ARTICULAR; INTRALESIONAL; INTRAMUSCULAR; INTRAVENOUS; SOFT TISSUE PRN
Status: DISCONTINUED | OUTPATIENT
Start: 2023-08-18 | End: 2023-08-18 | Stop reason: SURG

## 2023-08-18 RX ORDER — HYDROMORPHONE HYDROCHLORIDE 2 MG/ML
INJECTION, SOLUTION INTRAMUSCULAR; INTRAVENOUS; SUBCUTANEOUS PRN
Status: DISCONTINUED | OUTPATIENT
Start: 2023-08-18 | End: 2023-08-18 | Stop reason: HOSPADM

## 2023-08-18 RX ORDER — ACETAMINOPHEN 500 MG
1000 TABLET ORAL EVERY 6 HOURS
Status: DISCONTINUED | OUTPATIENT
Start: 2023-08-18 | End: 2023-08-22 | Stop reason: HOSPADM

## 2023-08-18 RX ORDER — HYDROMORPHONE HYDROCHLORIDE 1 MG/ML
1 INJECTION, SOLUTION INTRAMUSCULAR; INTRAVENOUS; SUBCUTANEOUS ONCE
Status: COMPLETED | OUTPATIENT
Start: 2023-08-18 | End: 2023-08-18

## 2023-08-18 RX ORDER — CELECOXIB 200 MG/1
200 CAPSULE ORAL DAILY
Status: DISCONTINUED | OUTPATIENT
Start: 2023-08-18 | End: 2023-08-22 | Stop reason: HOSPADM

## 2023-08-18 RX ORDER — HYDROMORPHONE HYDROCHLORIDE 1 MG/ML
0.2 INJECTION, SOLUTION INTRAMUSCULAR; INTRAVENOUS; SUBCUTANEOUS
Status: DISCONTINUED | OUTPATIENT
Start: 2023-08-18 | End: 2023-08-18 | Stop reason: HOSPADM

## 2023-08-18 RX ORDER — BISACODYL 10 MG
10 SUPPOSITORY, RECTAL RECTAL
Status: DISCONTINUED | OUTPATIENT
Start: 2023-08-18 | End: 2023-08-22 | Stop reason: HOSPADM

## 2023-08-18 RX ORDER — OXYCODONE HYDROCHLORIDE 10 MG/1
10 TABLET ORAL
Status: DISCONTINUED | OUTPATIENT
Start: 2023-08-18 | End: 2023-08-22 | Stop reason: HOSPADM

## 2023-08-18 RX ADMIN — FENTANYL CITRATE 50 MCG: 50 INJECTION, SOLUTION INTRAMUSCULAR; INTRAVENOUS at 14:15

## 2023-08-18 RX ADMIN — MIDAZOLAM 2 MG: 1 INJECTION, SOLUTION INTRAMUSCULAR; INTRAVENOUS at 12:47

## 2023-08-18 RX ADMIN — HYDROMORPHONE HYDROCHLORIDE 0.4 MG: 1 INJECTION, SOLUTION INTRAMUSCULAR; INTRAVENOUS; SUBCUTANEOUS at 14:55

## 2023-08-18 RX ADMIN — PROCHLORPERAZINE EDISYLATE 10 MG: 5 INJECTION, SOLUTION INTRAMUSCULAR; INTRAVENOUS at 00:04

## 2023-08-18 RX ADMIN — LIDOCAINE HYDROCHLORIDE 100 MG: 20 INJECTION, SOLUTION EPIDURAL; INFILTRATION; INTRACAUDAL at 12:51

## 2023-08-18 RX ADMIN — HYDROMORPHONE HYDROCHLORIDE 1 MG: 1 INJECTION, SOLUTION INTRAMUSCULAR; INTRAVENOUS; SUBCUTANEOUS at 02:54

## 2023-08-18 RX ADMIN — HYDROMORPHONE HYDROCHLORIDE 1 MG: 1 INJECTION, SOLUTION INTRAMUSCULAR; INTRAVENOUS; SUBCUTANEOUS at 10:03

## 2023-08-18 RX ADMIN — ONDANSETRON 4 MG: 2 INJECTION INTRAMUSCULAR; INTRAVENOUS at 14:15

## 2023-08-18 RX ADMIN — HALOPERIDOL LACTATE 1 MG: 5 INJECTION, SOLUTION INTRAMUSCULAR at 14:21

## 2023-08-18 RX ADMIN — HYDROMORPHONE HYDROCHLORIDE 1 MG: 2 INJECTION INTRAMUSCULAR; INTRAVENOUS; SUBCUTANEOUS at 13:50

## 2023-08-18 RX ADMIN — FENTANYL CITRATE 100 MCG: 50 INJECTION, SOLUTION INTRAMUSCULAR; INTRAVENOUS at 12:51

## 2023-08-18 RX ADMIN — OXYCODONE HYDROCHLORIDE 5 MG: 5 TABLET ORAL at 20:11

## 2023-08-18 RX ADMIN — SUGAMMADEX 200 MG: 100 INJECTION, SOLUTION INTRAVENOUS at 13:50

## 2023-08-18 RX ADMIN — FENTANYL CITRATE 50 MCG: 50 INJECTION, SOLUTION INTRAMUSCULAR; INTRAVENOUS at 14:30

## 2023-08-18 RX ADMIN — OXYCODONE HYDROCHLORIDE 5 MG: 5 TABLET ORAL at 23:51

## 2023-08-18 RX ADMIN — ACETAMINOPHEN 1000 MG: 500 TABLET, FILM COATED ORAL at 23:50

## 2023-08-18 RX ADMIN — HYDROMORPHONE HYDROCHLORIDE 1 MG: 1 INJECTION, SOLUTION INTRAMUSCULAR; INTRAVENOUS; SUBCUTANEOUS at 00:26

## 2023-08-18 RX ADMIN — DEXAMETHASONE SODIUM PHOSPHATE 8 MG: 4 INJECTION INTRA-ARTICULAR; INTRALESIONAL; INTRAMUSCULAR; INTRAVENOUS; SOFT TISSUE at 12:58

## 2023-08-18 RX ADMIN — HYDROMORPHONE HYDROCHLORIDE 1 MG: 1 INJECTION, SOLUTION INTRAMUSCULAR; INTRAVENOUS; SUBCUTANEOUS at 12:08

## 2023-08-18 RX ADMIN — PROPOFOL 150 MG: 10 INJECTION, EMULSION INTRAVENOUS at 12:51

## 2023-08-18 RX ADMIN — HYDROMORPHONE HYDROCHLORIDE 1 MG: 1 INJECTION, SOLUTION INTRAMUSCULAR; INTRAVENOUS; SUBCUTANEOUS at 05:38

## 2023-08-18 RX ADMIN — CEFAZOLIN 2 G: 1 INJECTION, POWDER, FOR SOLUTION INTRAMUSCULAR; INTRAVENOUS at 13:01

## 2023-08-18 RX ADMIN — ONDANSETRON 4 MG: 2 INJECTION INTRAMUSCULAR; INTRAVENOUS at 13:50

## 2023-08-18 RX ADMIN — HYDROMORPHONE HYDROCHLORIDE 1 MG: 1 INJECTION, SOLUTION INTRAMUSCULAR; INTRAVENOUS; SUBCUTANEOUS at 10:41

## 2023-08-18 RX ADMIN — SODIUM CHLORIDE, POTASSIUM CHLORIDE, SODIUM LACTATE AND CALCIUM CHLORIDE: 600; 310; 30; 20 INJECTION, SOLUTION INTRAVENOUS at 12:47

## 2023-08-18 RX ADMIN — ROCURONIUM BROMIDE 50 MG: 50 INJECTION, SOLUTION INTRAVENOUS at 12:51

## 2023-08-18 RX ADMIN — OXYCODONE HYDROCHLORIDE 10 MG: 5 SOLUTION ORAL at 14:15

## 2023-08-18 RX ADMIN — CELECOXIB 200 MG: 200 CAPSULE ORAL at 17:52

## 2023-08-18 RX ADMIN — HYDROMORPHONE HYDROCHLORIDE 1 MG: 1 INJECTION, SOLUTION INTRAMUSCULAR; INTRAVENOUS; SUBCUTANEOUS at 07:57

## 2023-08-18 ASSESSMENT — PAIN DESCRIPTION - PAIN TYPE
TYPE: ACUTE PAIN;SURGICAL PAIN
TYPE: SURGICAL PAIN
TYPE: SURGICAL PAIN
TYPE: ACUTE PAIN;SURGICAL PAIN
TYPE: SURGICAL PAIN
TYPE: ACUTE PAIN;SURGICAL PAIN
TYPE: SURGICAL PAIN
TYPE: ACUTE PAIN;SURGICAL PAIN
TYPE: SURGICAL PAIN

## 2023-08-18 ASSESSMENT — LIFESTYLE VARIABLES
EVER FELT BAD OR GUILTY ABOUT YOUR DRINKING: NO
TOTAL SCORE: 0
HAVE YOU EVER FELT YOU SHOULD CUT DOWN ON YOUR DRINKING: NO
TOTAL SCORE: 0
ALCOHOL_USE: NO
EVER HAD A DRINK FIRST THING IN THE MORNING TO STEADY YOUR NERVES TO GET RID OF A HANGOVER: NO
HAVE PEOPLE ANNOYED YOU BY CRITICIZING YOUR DRINKING: NO
AVERAGE NUMBER OF DAYS PER WEEK YOU HAVE A DRINK CONTAINING ALCOHOL: 0
HOW MANY TIMES IN THE PAST YEAR HAVE YOU HAD 5 OR MORE DRINKS IN A DAY: 0
CONSUMPTION TOTAL: NEGATIVE
ON A TYPICAL DAY WHEN YOU DRINK ALCOHOL HOW MANY DRINKS DO YOU HAVE: 0
TOTAL SCORE: 0

## 2023-08-18 ASSESSMENT — PAIN SCALES - GENERAL: PAIN_LEVEL: 2

## 2023-08-18 ASSESSMENT — PATIENT HEALTH QUESTIONNAIRE - PHQ9
2. FEELING DOWN, DEPRESSED, IRRITABLE, OR HOPELESS: NOT AT ALL
SUM OF ALL RESPONSES TO PHQ9 QUESTIONS 1 AND 2: 0
1. LITTLE INTEREST OR PLEASURE IN DOING THINGS: NOT AT ALL

## 2023-08-18 ASSESSMENT — COPD QUESTIONNAIRES
DO YOU EVER COUGH UP ANY MUCUS OR PHLEGM?: NO/ONLY WITH OCCASIONAL COLDS OR INFECTIONS
DURING THE PAST 4 WEEKS HOW MUCH DID YOU FEEL SHORT OF BREATH: NONE/LITTLE OF THE TIME
HAVE YOU SMOKED AT LEAST 100 CIGARETTES IN YOUR ENTIRE LIFE: YES
COPD SCREENING SCORE: 2

## 2023-08-18 NOTE — PROGRESS NOTES
"  DATE: 8/18/2023    INTERVAL EVENTS:  Total bilirubin slightly decreased.    PHYSICAL EXAMINATION:  Vital Signs: /81   Pulse (!) 113   Temp 36.9 °C (98.5 °F) (Oral)   Resp 18   Ht 1.651 m (5' 5\")   Wt 73 kg (160 lb 15 oz)   SpO2 94%     Remains uncomfortable.  Minimal scleral icterus.  Tender right upper quadrant and diffuse guarding.    LABORATORY VALUES:  Recent Labs     08/17/23  1201 08/18/23  0132   WBC 25.5* 21.2*   RBC 5.61* 5.05   HEMOGLOBIN 16.3* 14.6   HEMATOCRIT 48.9* 44.7   MCV 87.2 88.5   MCH 29.1 28.9   MCHC 33.3 32.7   RDW 40.9 41.9   PLATELETCT 344 336   MPV 9.5 9.6     Recent Labs     08/17/23  1201 08/18/23  0132   ASTSGOT 182* 73*   ALTSGPT 685* 442*   TBILIRUBIN 1.9* 1.4   ALKPHOSPHAT 178* 137*   GLOBULIN 2.9 2.3     IMAGING:  MRCP imaging demonstrated no choledocholithiasis or common ductal dilation.     ASSESSMENT AND PLAN:  Mild acute cholecystitis.  Chronic pancreatitis.  Suspicion for transient choledocholithiasis.    PREOPERATIVE NOTE: I have seen and examined the patient today.  Critical physical examination findings, laboratory indices, and radiographic studies reviewed.  I recommend laparoscopic cholecystectomy and intraoperative cholangiogram.    The operative strategy was explained and reviewed. Alternatives discussed. Potential complications including, but not limited to, infection, bleeding, damage to adjacent structures, and anesthetic complications were discussed. Questions were elicited and answered to the patient's satisfaction.  Operative consent signed.           ____________________________________     Al Carrera M.D.    DD: 8/18/2023  8:10 AM    "

## 2023-08-18 NOTE — ED NOTES
Assumed care of pt. Pt awaiting inpt bed assignment. Plan for surgery tomorrow. Pt sitting up in bed. Appears uncomfortable. Pt on O2 1LNC.

## 2023-08-18 NOTE — OR NURSING
Patient arrived to PACU in stable condition.  Surgical lap sites to abdomen x3, one additional site at ANALI site. ANALI compresed to self suction  Medicated for pain and nausea per MAR  Patient tolerated clears without nausea or vomiting  All belongings with patient  Report given to Grecia PRESLEY. Patient transferring to [id number]

## 2023-08-18 NOTE — ANESTHESIA POSTPROCEDURE EVALUATION
Patient: Mariia Samayoa    Procedure Summary     Date: 08/18/23 Room / Location: [place] [address] / SURGERY [facility]    Anesthesia Start: 1247 Anesthesia Stop: 1410    Procedure: CHOLECYSTECTOMY, LAPAROSCOPIC WITH CHOLANGIOGRAM (Abdomen) Diagnosis: (Gangrenous Colecystitis)    Surgeons: Al Carrera M.D. Responsible Provider: Noemi Castillo M.D.    Anesthesia Type: general ASA Status: 3          Final Anesthesia Type: general  Last vitals  BP   Blood Pressure: 110/60    Temp   36.5 °C (97.7 °F)    Pulse   99   Resp   14    SpO2   95 %      Anesthesia Post Evaluation    Patient location during evaluation: PACU  Patient participation: complete - patient participated  Level of consciousness: awake and alert  Pain score: 2    Airway patency: patent  Anesthetic complications: no  Cardiovascular status: hemodynamically stable  Respiratory status: acceptable  Hydration status: euvolemic    PONV: none          No notable events documented.     Nurse Pain Score: 2 (NPRS)

## 2023-08-18 NOTE — ANESTHESIA PREPROCEDURE EVALUATION
Case: [id number] Date/Time: 08/18/23 1051    Procedure: CHOLECYSTECTOMY, LAPAROSCOPIC WITH CHOLANGIOGRAM    Location: [facility] OR 12 / SURGERY [facility]    Surgeons: Al Carrera M.D.      28yo for lap rivka  Quit drinking alcohol 6 months ago, + vape nicotine and marijuana oral use  Childhood asthma, seizures only alcohol related  Relevant Problems   PULMONARY   (positive) Mild intermittent asthma without complication      NEURO   (positive) Seizure (HCC)       Physical Exam    Airway   Mallampati: I  TM distance: >3 FB  Neck ROM: full       Cardiovascular - normal exam  Rhythm: regular  Rate: normal  (-) murmur     Dental - normal exam           Pulmonary - normal exam  Breath sounds clear to auscultation     Abdominal    Neurological - normal exam                 Anesthesia Plan    ASA 3   ASA physical status 3 criteria: alcohol and/or substance dependence or abuse    Plan - general       Airway plan will be ETT          Induction: intravenous    Postoperative Plan: Postoperative administration of opioids is intended.    Pertinent diagnostic labs and testing reviewed    Informed Consent:    Anesthetic plan and risks discussed with patient.    Use of blood products discussed with: patient whom consented to blood products.

## 2023-08-18 NOTE — ANESTHESIA TIME REPORT
Anesthesia Start and Stop Event Times     Date Time Event    8/18/2023 1224 Ready for Procedure     1247 Anesthesia Start     1410 Anesthesia Stop        Responsible Staff  08/18/23    Name Role Begin End    Noemi Castillo M.D. Anesth 1247 1410        Overtime Reason:  no overtime (within assigned shift)    Comments:

## 2023-08-18 NOTE — OP REPORT
DATE OF OPERATION:   8/18/2023     PREOPERATIVE DIAGNOSIS: Right upper quadrant pain, gallbladder sludge, acute cholecystitis, and choledocholithiasis.    POSTOPERATIVE DIAGNOSIS: Right upper quadrant pain, gallbladder sludge, gangrenous cholecystitis, and bile peritonitis .    PROCEDURE PERFORMED: Laparoscopic cholecystectomy and intraoperative cholangiogram    SURGEON:    Al Carrera M.D.    ANESTHESIOLOGIST:  Odette Castillo M.D.    ANESTHESIA:   General endotracheal anesthesia.    ASA CLASSIFICATION:  III.    INDICATIONS: The patient is a 27 year-old woman with clinical and radiographic findings of acute cholecystitis and choledocholithiasis. She is taken to the operating room for a laparoscopic cholecystectomy and intraoperative cholangiography.     FINDINGS: Gallbladder wall thickening and acute inflammation.  Focal area of gangrenous cholecystitis with perforation along the lateral aspect of the gallbladder dome.  Bile peritonitis.  Intraoperative cholangiography demonstrated normal diameter biliary ducts with no evidence of dilation, filling defects, duct narrowing, or extravasation. Contrast flows freely into the duodenum.    WOUND CLASSIFICATION:  Class IV, Dirty or Infected. Infection present at the time of surgery (PATOS).    SPECIMEN:    Gallbladder.    ESTIMATED BLOOD LOSS:  10 mL.    PROCEDURE: Following informed consent consent, the patient was properly identified, taken to the operating room and placed in supine position where general endotracheal anesthesia was administered. Intravenous antibiotics were administered by the anesthesiologist in correct time interval. The patient voided prior to surgery. A urinary catheter was not placed. Sequential compression devices were employed. The abdomen was prepped and draped into a sterile field. A timeout was conducted with the full attention and participation of all operating room personnel.    Marcaine 0.5% was used to infiltrate the port sites.  A 5 mm infraumbilical midline incision was made and the subcutaneous tissues spread bluntly. The fascia was elevated with a hook and a Veress needle was atraumatically inserted. Carbon dioxide pneumoperitoneum was instilled. A 5 mm separator port was passed. A 5 mm 30 degree lens and camera was passed into the peritoneal cavity. An 11 mm port was placed in the epigastric midline under direct vision. Two 5 mm right subcostal ports were placed under direct vision.     The gallbladder was identified and elevated. Dissection was carried out to completely expose and delineate the hepatocystic triangle. The critical view was achieved definitively identifying the single cystic duct and single cystic artery entering the gallbladder. The cystic artery was multiply clipped proximally, once distally and divided. The cystic duct was clipped distally and a proximal ductotomy made with hook scissors. A pre-flushed cholangiocatheter was passed percutaneously through a 2 mm right subcostal incision. The ductotomy was cannulated and a transcystic cholangiogram was performed under fluoroscopy. Findings are detailed above. The cholangiocatheter was withdrawn. The proximal cystic duct was secured with multiple clips and divided. The gallbladder was dissected free from the undersurface of the liver using electrocautery and placed within a laparoscopic specimen retrieval bag. The gallbladder was delivered intact from the abdominal cavity and submitted for pathology. The gallbladder fossa was inspected. Hemostasis was satisfactory.    The epigastric port site fascia was approximated using a trocar site closure device with a 0 VICRYL® Plus Antibacterial suture. The abdomen was desufflated and the ports were removed.  The port site skin incisions were closed with interrupted 4-0 VICRYL® Plus Antibacterial subcuticular sutures. A DERMABOND ADVANCED® Topical Skin Adhesive dressing was applied.    The patient tolerated the procedure well, and  there were no apparent complications. All sponge, needle, and instrument counts were correct on 2 separate occasions. The patient was awakened, extubated, and transferred to  to the post anesthesia care unit (PACU) in satisfactory condition.       ____________________________________     Al Carrera M.D.    DD: 8/18/2023  2:09 PM

## 2023-08-18 NOTE — ED NOTES
Assumed care of pt, bedside report received. Introduced self as pt RN, GCS 15, NAD, VSS on RA, aware of POC, denies needs at this time, call light in reach, will continue to monitor.

## 2023-08-18 NOTE — PROGRESS NOTES
Pt has been in significant pain throughout the night that has been somewhat managed with IV dilaudid.  Calm and VSS on RA, tachy with movement.

## 2023-08-18 NOTE — ANESTHESIA PROCEDURE NOTES
Airway    Date/Time: 8/18/2023 12:52 PM    Performed by: Noemi Castillo M.D.  Authorized by: Noemi Castillo M.D.    Location:  OR  Urgency:  Elective  Difficult Airway: No    Indications for Airway Management:  Anesthesia      Spontaneous Ventilation: absent    Sedation Level:  Deep  Preoxygenated: Yes    Patient Position:  Sniffing  Mask Difficulty Assessment:  1 - vent by mask  Final Airway Type:  Endotracheal airway  Final Endotracheal Airway:  ETT  Cuffed: Yes    Technique Used for Successful ETT Placement:  Direct laryngoscopy    Insertion Site:  Oral  Blade Type:  Lexi  Laryngoscope Blade/Videolaryngoscope Blade Size:  3  ETT Size (mm):  7.0  Measured from:  Teeth  ETT to Teeth (cm):  21  Placement Verified by: auscultation and capnometry    Cormack-Lehane Classification:  Grade I - full view of glottis  Number of Attempts at Approach:  1  Number of Other Approaches Attempted:  0

## 2023-08-18 NOTE — CONSULTS
Date of Consultation:  8/18/2023    Patient: : Mariia Samayoa  MRN: [id number]    Referring Physician: Dr. Bryan     GI:Sherwin Fay M.D.     Reason for Consultation: Abnormal biochemical liver test, biliary pain    History of Present Illness:   27-year-old female with a history of alcohol withdrawal with seizure disorder who has been sober for 6 months presents with epigastric right upper quadrant abdominal pain in the setting of nausea and vomiting.  Her biochemical liver tests are elevated.  Her right upper quadrant ultrasound is abnormal.  She underwent an MRCP last night with the official report pending.  Surgery has consulted GI for possible preoperative ERCP.  Patient continues to have right upper quadrant abdominal pain.  She is requiring IV opiates.  She is NPO.      Past Medical History:   Diagnosis Date    Anxiety     ASTHMA     exercise induced    Depression     EtOH dependence (HCC)        History reviewed. No pertinent surgical history.    Family History   Problem Relation Age of Onset    Cancer Mother     Hypertension Maternal Grandmother        Social History     Socioeconomic History    Marital status: Single   Tobacco Use    Smoking status: Former     Types: Cigarettes    Smokeless tobacco: Never    Tobacco comments:     Stopped smoking 11 years ago   Vaping Use    Vaping Use: Every day    Substances: Nicotine, Flavoring    Devices: Disposable, Pre-filled or refillable cartridge, Refillable tank   Substance and Sexual Activity    Alcohol use: Not Currently     Alcohol/week: 6.0 oz     Types: 5 Cans of beer, 5 Shots of liquor per week     Comment: Quit 2/22/2023    Drug use: Yes     Types: Inhaled, Marijuana    Sexual activity: Yes     Birth control/protection: I.U.D.       Current Meds (name, sig, last dose):     Current Facility-Administered Medications:     Respiratory Therapy Consult    Pharmacy Consult Request    ondansetron    ondansetron    [DISCONTINUED] oxyCODONE immediate-release **OR**  [DISCONTINUED] oxyCODONE immediate-release **OR** HYDROmorphone    prochlorperazine    Current Outpatient Medications:     therapeutic multivitamin-minerals, 1 Tablet, Oral, DAILY, 8/10/2023 at AM    Omega-3 Fatty Acids (FISH OIL PO), 1 Capsule, Oral, DAILY, 8/10/2023 at AM      ROS  10 systems reviewed and are negative unless otherwise noted in history of present illness.    Physical Exam:  Vitals:    08/18/23 0006 08/18/23 0242 08/18/23 0300 08/18/23 0608   BP: 137/89 135/76 135/76 120/73   Pulse: (!) 105 (!) 116 (!) 111 (!) 135   Resp:  20     Temp:  37.2 °C (98.9 °F)  36.9 °C (98.5 °F)   TempSrc:  Temporal  Oral   SpO2: 97% 93% 92% 94%   Weight:       Height:           Physical Exam  General: Non-toxic appearing, without acute distress.  HEENT: Anicteric sclera, OP with moist mucous membranes.  Neck: trachea midline, supple.  Lungs: Clear bilaterally. No labored breathing.  Heart: S1S2 Regular rate. Pulses normal.  Abd: Soft, tender in the right upper quadrant, positive for guarding but no rebound positive bowel sounds.   Ext: without cubbing or cyanosis or edema.  Neurologic: without focal deficit. Moves all extremities.  Psychiatric: normal speech, mood, and affect.      Labs:  Recent Labs     08/17/23  1201 08/18/23  0132   SODIUM 136 137   POTASSIUM 4.1 3.9   CHLORIDE 101 102   CO2 18* 23   BUN 12 9   CREATININE 0.55 0.55   CALCIUM 9.4 8.4*     Recent Labs     08/17/23  1201 08/18/23  0132   ALTSGPT 685* 442*   ASTSGOT 182* 73*   ALKPHOSPHAT 178* 137*   TBILIRUBIN 1.9* 1.4   LIPASE 33  --    GLUCOSE 126* 111*     Recent Labs     08/17/23  1201 08/18/23  0132   WBC 25.5* 21.2*   NEUTSPOLYS 92.10* 84.80*   LYMPHOCYTES 3.20* 8.90*   MONOCYTES 3.80 5.70   EOSINOPHILS 0.10 0.00   BASOPHILS 0.20 0.10   ASTSGOT 182* 73*   ALTSGPT 685* 442*   ALKPHOSPHAT 178* 137*   TBILIRUBIN 1.9* 1.4     Recent Labs     08/17/23  1201 08/18/23  0132   RBC 5.61* 5.05   HEMOGLOBIN 16.3* 14.6   HEMATOCRIT 48.9* 44.7   PLATELETCT 344  336     Recent Results (from the past 24 hour(s))   POCT glucose device results    Collection Time: 08/17/23 11:50 AM   Result Value Ref Range    POC Glucose, Blood 132 (H) 65 - 99 mg/dL   CBC WITH DIFFERENTIAL    Collection Time: 08/17/23 12:01 PM   Result Value Ref Range    WBC 25.5 (H) 4.8 - 10.8 K/uL    RBC 5.61 (H) 4.20 - 5.40 M/uL    Hemoglobin 16.3 (H) 12.0 - 16.0 g/dL    Hematocrit 48.9 (H) 37.0 - 47.0 %    MCV 87.2 81.4 - 97.8 fL    MCH 29.1 27.0 - 33.0 pg    MCHC 33.3 32.2 - 35.5 g/dL    RDW 40.9 35.9 - 50.0 fL    Platelet Count 344 164 - 446 K/uL    MPV 9.5 9.0 - 12.9 fL    Neutrophils-Polys 92.10 (H) 44.00 - 72.00 %    Lymphocytes 3.20 (L) 22.00 - 41.00 %    Monocytes 3.80 0.00 - 13.40 %    Eosinophils 0.10 0.00 - 6.90 %    Basophils 0.20 0.00 - 1.80 %    Immature Granulocytes 0.60 0.00 - 0.90 %    Nucleated RBC 0.00 0.00 - 0.20 /100 WBC    Neutrophils (Absolute) 23.55 (H) 1.82 - 7.42 K/uL    Lymphs (Absolute) 0.82 (L) 1.00 - 4.80 K/uL    Monos (Absolute) 0.96 (H) 0.00 - 0.85 K/uL    Eos (Absolute) 0.02 0.00 - 0.51 K/uL    Baso (Absolute) 0.04 0.00 - 0.12 K/uL    Immature Granulocytes (abs) 0.15 (H) 0.00 - 0.11 K/uL    NRBC (Absolute) 0.00 K/uL   COMP METABOLIC PANEL    Collection Time: 08/17/23 12:01 PM   Result Value Ref Range    Sodium 136 135 - 145 mmol/L    Potassium 4.1 3.6 - 5.5 mmol/L    Chloride 101 96 - 112 mmol/L    Co2 18 (L) 20 - 33 mmol/L    Anion Gap 17.0 (H) 7.0 - 16.0    Glucose 126 (H) 65 - 99 mg/dL    Bun 12 8 - 22 mg/dL    Creatinine 0.55 0.50 - 1.40 mg/dL    Calcium 9.4 8.5 - 10.5 mg/dL    Correct Calcium 8.8 8.5 - 10.5 mg/dL    AST(SGOT) 182 (H) 12 - 45 U/L    ALT(SGPT) 685 (H) 2 - 50 U/L    Alkaline Phosphatase 178 (H) 30 - 99 U/L    Total Bilirubin 1.9 (H) 0.1 - 1.5 mg/dL    Albumin 4.8 3.2 - 4.9 g/dL    Total Protein 7.7 6.0 - 8.2 g/dL    Globulin 2.9 1.9 - 3.5 g/dL    A-G Ratio 1.7 g/dL   LIPASE    Collection Time: 08/17/23 12:01 PM   Result Value Ref Range    Lipase 33 11 - 82  U/L   HCG QUAL SERUM    Collection Time: 08/17/23 12:01 PM   Result Value Ref Range    Beta-Hcg Qualitative Serum Negative Negative   ESTIMATED GFR    Collection Time: 08/17/23 12:01 PM   Result Value Ref Range    GFR (CKD-EPI) 127 >60 mL/min/1.73 m 2   Lactic Acid    Collection Time: 08/17/23  1:27 PM   Result Value Ref Range    Lactic Acid 1.1 0.5 - 2.0 mmol/L   URINALYSIS,CULTURE IF INDICATED    Collection Time: 08/17/23  3:20 PM    Specimen: Urine   Result Value Ref Range    Color DK Yellow     Character Clear     Specific Gravity >=1.045 (A) <1.035    Ph 5.5 5.0 - 8.0    Glucose Negative Negative mg/dL    Ketones 40 (A) Negative mg/dL    Protein Negative Negative mg/dL    Bilirubin Small (A) Negative    Urobilinogen, Urine 0.2 Negative    Nitrite Negative Negative    Leukocyte Esterase Negative Negative    Occult Blood Negative Negative    Micro Urine Req see below    CBC with Differential: Tomorrow AM    Collection Time: 08/18/23  1:32 AM   Result Value Ref Range    WBC 21.2 (H) 4.8 - 10.8 K/uL    RBC 5.05 4.20 - 5.40 M/uL    Hemoglobin 14.6 12.0 - 16.0 g/dL    Hematocrit 44.7 37.0 - 47.0 %    MCV 88.5 81.4 - 97.8 fL    MCH 28.9 27.0 - 33.0 pg    MCHC 32.7 32.2 - 35.5 g/dL    RDW 41.9 35.9 - 50.0 fL    Platelet Count 336 164 - 446 K/uL    MPV 9.6 9.0 - 12.9 fL    Neutrophils-Polys 84.80 (H) 44.00 - 72.00 %    Lymphocytes 8.90 (L) 22.00 - 41.00 %    Monocytes 5.70 0.00 - 13.40 %    Eosinophils 0.00 0.00 - 6.90 %    Basophils 0.10 0.00 - 1.80 %    Immature Granulocytes 0.50 0.00 - 0.90 %    Nucleated RBC 0.00 0.00 - 0.20 /100 WBC    Neutrophils (Absolute) 18.00 (H) 1.82 - 7.42 K/uL    Lymphs (Absolute) 1.88 1.00 - 4.80 K/uL    Monos (Absolute) 1.21 (H) 0.00 - 0.85 K/uL    Eos (Absolute) 0.01 0.00 - 0.51 K/uL    Baso (Absolute) 0.03 0.00 - 0.12 K/uL    Immature Granulocytes (abs) 0.11 0.00 - 0.11 K/uL    NRBC (Absolute) 0.00 K/uL   Comp Metabolic Panel (CMP): Tomorrow AM    Collection Time: 08/18/23  1:32 AM    Result Value Ref Range    Sodium 137 135 - 145 mmol/L    Potassium 3.9 3.6 - 5.5 mmol/L    Chloride 102 96 - 112 mmol/L    Co2 23 20 - 33 mmol/L    Anion Gap 12.0 7.0 - 16.0    Glucose 111 (H) 65 - 99 mg/dL    Bun 9 8 - 22 mg/dL    Creatinine 0.55 0.50 - 1.40 mg/dL    Calcium 8.4 (L) 8.5 - 10.5 mg/dL    Correct Calcium 8.5 8.5 - 10.5 mg/dL    AST(SGOT) 73 (H) 12 - 45 U/L    ALT(SGPT) 442 (H) 2 - 50 U/L    Alkaline Phosphatase 137 (H) 30 - 99 U/L    Total Bilirubin 1.4 0.1 - 1.5 mg/dL    Albumin 3.9 3.2 - 4.9 g/dL    Total Protein 6.2 6.0 - 8.2 g/dL    Globulin 2.3 1.9 - 3.5 g/dL    A-G Ratio 1.7 g/dL   ESTIMATED GFR    Collection Time: 08/18/23  1:32 AM   Result Value Ref Range    GFR (CKD-EPI) 127 >60 mL/min/1.73 m 2         Imaging:  US-RUQ  Narrative: 8/17/2023 5:01 PM    HISTORY/REASON FOR EXAM:  Pain  Abdominal pain    TECHNIQUE/EXAM DESCRIPTION AND NUMBER OF VIEWS:  Real-time sonography of the liver and biliary tree.    COMPARISON: CT earlier in the day    FINDINGS:  The liver is normal in contour. There is no evidence of solid mass lesion. The liver measures 15.3 cm.    Gallbladder is distended with mild sludge and probable trace pericholecystic fluid and bowel wall thickening. Positive sonographic Nath's sign per the technologist The common duct measures 8 mm.    The pancreas is obscured by bowel gas.  The visualized aorta is normal in caliber.    Intrahepatic IVC is patent.    The portal vein is patent with hepatopetal flow. The MPV measures 1 cm.    The right kidney measures 11.04 cm.    There is no ascites.  Impression: 1.  Distended gallbladder with wall thickening, gallbladder sludge and trace pericholecystic fluid is suspicious for acute cholecystitis.  2.  There is mild biliary dilatation.  CT-ABDOMEN-PELVIS WITH  Narrative: 8/17/2023 2:08 PM    HISTORY/REASON FOR EXAM:  Epigastric abdominal pain.    TECHNIQUE/EXAM DESCRIPTION:   CT scan of the abdomen and pelvis with  contrast.    Contrast-enhanced helical scanning was obtained from the diaphragmatic domes through the pubic symphysis following the bolus administration of nonionic contrast without complication.    100 mL of Omnipaque 350 nonionic contrast was administered without complication.    Low dose optimization technique was utilized for this CT exam including automated exposure control and adjustment of the mA and/or kV according to patient size.    COMPARISON: No prior studies available.    FINDINGS:  Lower Chest: Unremarkable.    Liver: Normal.    Spleen: Unremarkable.    Pancreas: There are coarse calcifications in the uncinate process and pancreatic head extending slightly into the body. There is dilation of the main pancreatic duct to 4 to 5 mm.    Gallbladder: The gallbladder is somewhat distended. The gallbladder wall is thickened. There is adjacent fat stranding. No calcified stones are in the gallbladder..    Biliary: There is mild extrahepatic biliary dilatation. The common duct measures 7 mm the danyell hepatis. There is a possibly intraluminal filling defect in the distal duct at the region of the ampulla which measures up to 5 mm in axial cross-section..    Adrenal glands: Normal.    Kidneys: Unremarkable without hydronephrosis.    Bowel: No evidence of bowel obstruction. There is possible wall thickening in the ascending colon adjacent to the fluid in the RIGHT paracolic gutter.    Lymph nodes: No adenopathy.    Vasculature: Unremarkable.    Peritoneum: There is some fluid in the RIGHT paracolic gutter.    Musculoskeletal: No acute or destructive process.    Pelvis: There is trace pelvic fluid..  Impression: 1.  Findings suspicious for acute cholecystitis though no radiodense stones are seen within the gallbladder  2.  Coarse calcifications in the pancreas, likely the residua of chronic pancreatitis  3.  Possible 5 mm choledocholith though this could be pancreatic parenchymal calcification. Further assessment  might be performed with MRCP.  4.  Dilation of the pancreatic duct could be related to chronic pancreatitis however ductal obstruction from stone is not excluded.  5.  Small amount of fluid in the RIGHT paracolic gutter  6.  Changes in the ascending colon are likely reactive to adjacent inflammatory changes or artifactual and related to segmental underdistention rather than related to colitis from other cause        MDM Data Review:  -Records reviewed and summarized in current documentation  -I personally reviewed and interpreted the laboratory results  -I personally reviewed the radiology images  -I have personally reviewed medications    Hospital Problem List:  Active Hospital Problems    Diagnosis     Choledocholithiasis [K80.50]        Impressions:  27-year-old female with a history of alcohol abuse who has been sober for 6 months.  She presents with biliary colic.  Her biochemical liver test, right upper quadrant ultrasound, CT scan reviewed.  Her MRCP personally reviewed.  Official report from radiology pending.  She has findings of chronic pancreatitis.  I believe she has microlithiasis.  I have discussed this with Dr. Bryan.  I am recommending preoperative ERCP.  He is in agreement.  Risk benefits alternatives reviewed with the patient.  She is willing to proceed.      Recommendations:  ERCP this afternoon.  Timing of cholecystectomy deferred to surgical expertise.  Anticipate possibly tomorrow assuming everything goes well today.  Continue n.p.o. status.    Addendum: I have spoken with Dr. Carrera.  After reviewing case with him we have decided the best option for the patient given the downtrending bilirubin is for cholecystectomy with IOC and ERCP postoperatively if needed.  GI will follow from a distance.  Await intraoperative cholangiogram and clinical course regarding need for ERCP downstream.

## 2023-08-18 NOTE — H&P
"      CHIEF COMPLAINT: Ascending cholangitis.     HISTORY OF PRESENT ILLNESS: The patient is 27-year-old female with history for seizures last year as well as history of alcohol use and past history of alcohol withdrawal.  She reportedly has been sober for 6 months.  Patient states that she began to have abdominal pain yesterday which is accompanied with nausea and vomiting.  She also states that she has some mild shortness of breath with fevers and chills.  She denies any diarrhea or blood in her vomitus.    PAST MEDICAL HISTORY:  has a past medical history of Anxiety, ASTHMA, Depression, and EtOH dependence ([facility]).    PAST SURGICAL HISTORY:  has no past surgical history on file.    ALLERGIES:   Allergies   Allergen Reactions    Pcn [Penicillins] Unspecified     Childhood  Unsure of reaction       CURRENT MEDICATIONS:   Home Medications       Reviewed by Rylie Bah (Pharmacy Tech) on 08/17/23 at 1557  Med List Status: Complete     Medication Last Dose Status   Omega-3 Fatty Acids (FISH OIL PO) 8/10/2023 Active   therapeutic multivitamin-minerals (THERAGRAN-M) Tab 8/10/2023 Active                  FAMILY HISTORY: family history includes Cancer in her mother; Hypertension in her maternal grandmother.    SOCIAL HISTORY:  reports that she has quit smoking. Her smoking use included cigarettes. She has never used smokeless tobacco. She reports that she does not currently use alcohol after a past usage of about 6.0 oz of alcohol per week. She reports current drug use. Drugs: Inhaled and Marijuana.    REVIEW OF SYSTEMS: Review of systems is remarkable for the following epigastric pain nausea and vomiting starting yesterday. The remainder of the comprehensive ROS is negative with the exception of the aforementioned HPI, PMH, and PSH bullets in accordance with CMS guideline.    PHYSICAL EXAMINATION:      Vital Signs: BP (!) 131/94   Pulse 87   Temp 36.4 °C (97.5 °F) (Temporal)   Resp (!) 24   Ht 1.651 m (5' 5\") "   Wt 73 kg (160 lb 15 oz)   SpO2 98%   Physical Exam  Vitals and nursing note reviewed.   Constitutional:       Comments: Mild distress, tearful   HENT:      Head: Normocephalic and atraumatic.      Mouth/Throat:      Mouth: Mucous membranes are moist.      Pharynx: Oropharynx is clear.   Eyes:      Extraocular Movements: Extraocular movements intact.      Conjunctiva/sclera: Conjunctivae normal.   Cardiovascular:      Rate and Rhythm: Normal rate and regular rhythm.      Pulses: Normal pulses.   Pulmonary:      Effort: Pulmonary effort is normal.      Breath sounds: Normal breath sounds. No stridor.   Abdominal:      General: There is no distension.      Palpations: Abdomen is soft.      Comments: Epigastric and right upper quadrant tenderness with mild rebound   Musculoskeletal:         General: Normal range of motion.      Cervical back: Normal range of motion.      Right lower leg: No edema.      Left lower leg: No edema.   Skin:     General: Skin is warm and dry.      Coloration: Skin is not jaundiced.   Neurological:      General: No focal deficit present.         LABORATORY VALUES:   Recent Labs     08/17/23  1201   WBC 25.5*   RBC 5.61*   HEMOGLOBIN 16.3*   HEMATOCRIT 48.9*   MCV 87.2   MCH 29.1   MCHC 33.3   RDW 40.9   PLATELETCT 344   MPV 9.5     Recent Labs     08/17/23  1201   SODIUM 136   POTASSIUM 4.1   CHLORIDE 101   CO2 18*   GLUCOSE 126*   BUN 12   CREATININE 0.55   CALCIUM 9.4     Recent Labs     08/17/23  1201   ASTSGOT 182*   ALTSGPT 685*   TBILIRUBIN 1.9*   ALKPHOSPHAT 178*   GLOBULIN 2.9            IMAGING:   US-RUQ   Final Result      1.  Distended gallbladder with wall thickening, gallbladder sludge and trace pericholecystic fluid is suspicious for acute cholecystitis.   2.  There is mild biliary dilatation.      CT-ABDOMEN-PELVIS WITH   Final Result      1.  Findings suspicious for acute cholecystitis though no radiodense stones are seen within the gallbladder   2.  Coarse calcifications  in the pancreas, likely the residua of chronic pancreatitis   3.  Possible 5 mm choledocholith though this could be pancreatic parenchymal calcification. Further assessment might be performed with MRCP.   4.  Dilation of the pancreatic duct could be related to chronic pancreatitis however ductal obstruction from stone is not excluded.   5.  Small amount of fluid in the RIGHT paracolic gutter   6.  Changes in the ascending colon are likely reactive to adjacent inflammatory changes or artifactual and related to segmental underdistention rather than related to colitis from other cause      [id number]    (Results Pending)       ASSESSMENT AND PLAN:   27-year-old female presents with what appears to be cholangitis and possible cholecystitis and mild sepsis with white count of 25 but no hemodynamic effects at this time.  She has received some fluid in the emergency department and has been admitted to the surgical service.  She is NPO.  Lovenox ordered.  She is on ceftriaxone and Flagyl.  A.m. labs ordered.  Dr. Reynoso has consulted from gastroenterology and recommended MRI be ordered though that they may be canceled depending on radiology review.  Patient will likely undergo ERCP tomorrow and we will likely plan on cholecystectomy to follow that.       ____________________________________     Saurav Bryan D.O.    DD: 8/17/2023  6:18 PM

## 2023-08-19 LAB
ALBUMIN SERPL BCP-MCNC: 3.3 G/DL (ref 3.2–4.9)
ALBUMIN/GLOB SERPL: 1.5 G/DL
ALP SERPL-CCNC: 103 U/L (ref 30–99)
ALT SERPL-CCNC: 274 U/L (ref 2–50)
ANION GAP SERPL CALC-SCNC: 7 MMOL/L (ref 7–16)
AST SERPL-CCNC: 51 U/L (ref 12–45)
BASOPHILS # BLD AUTO: 0.1 % (ref 0–1.8)
BASOPHILS # BLD: 0.04 K/UL (ref 0–0.12)
BILIRUB SERPL-MCNC: 1.1 MG/DL (ref 0.1–1.5)
BUN SERPL-MCNC: 8 MG/DL (ref 8–22)
CALCIUM ALBUM COR SERPL-MCNC: 9 MG/DL (ref 8.5–10.5)
CALCIUM SERPL-MCNC: 8.4 MG/DL (ref 8.5–10.5)
CHLORIDE SERPL-SCNC: 101 MMOL/L (ref 96–112)
CO2 SERPL-SCNC: 26 MMOL/L (ref 20–33)
CREAT SERPL-MCNC: 0.55 MG/DL (ref 0.5–1.4)
EOSINOPHIL # BLD AUTO: 0.01 K/UL (ref 0–0.51)
EOSINOPHIL NFR BLD: 0 % (ref 0–6.9)
ERYTHROCYTE [DISTWIDTH] IN BLOOD BY AUTOMATED COUNT: 41.2 FL (ref 35.9–50)
GFR SERPLBLD CREATININE-BSD FMLA CKD-EPI: 127 ML/MIN/1.73 M 2
GLOBULIN SER CALC-MCNC: 2.2 G/DL (ref 1.9–3.5)
GLUCOSE SERPL-MCNC: 128 MG/DL (ref 65–99)
HCT VFR BLD AUTO: 37.7 % (ref 37–47)
HGB BLD-MCNC: 12.7 G/DL (ref 12–16)
IMM GRANULOCYTES # BLD AUTO: 0.17 K/UL (ref 0–0.11)
IMM GRANULOCYTES NFR BLD AUTO: 0.6 % (ref 0–0.9)
LYMPHOCYTES # BLD AUTO: 1.38 K/UL (ref 1–4.8)
LYMPHOCYTES NFR BLD: 4.9 % (ref 22–41)
MCH RBC QN AUTO: 29.5 PG (ref 27–33)
MCHC RBC AUTO-ENTMCNC: 33.7 G/DL (ref 32.2–35.5)
MCV RBC AUTO: 87.5 FL (ref 81.4–97.8)
MONOCYTES # BLD AUTO: 1.37 K/UL (ref 0–0.85)
MONOCYTES NFR BLD AUTO: 4.9 % (ref 0–13.4)
NEUTROPHILS # BLD AUTO: 25.27 K/UL (ref 1.82–7.42)
NEUTROPHILS NFR BLD: 89.5 % (ref 44–72)
NRBC # BLD AUTO: 0 K/UL
NRBC BLD-RTO: 0 /100 WBC (ref 0–0.2)
PLATELET # BLD AUTO: 286 K/UL (ref 164–446)
PMV BLD AUTO: 9.5 FL (ref 9–12.9)
POTASSIUM SERPL-SCNC: 4.3 MMOL/L (ref 3.6–5.5)
PROT SERPL-MCNC: 5.5 G/DL (ref 6–8.2)
RBC # BLD AUTO: 4.31 M/UL (ref 4.2–5.4)
SODIUM SERPL-SCNC: 134 MMOL/L (ref 135–145)
WBC # BLD AUTO: 28.2 K/UL (ref 4.8–10.8)

## 2023-08-19 PROCEDURE — 36415 COLL VENOUS BLD VENIPUNCTURE: CPT

## 2023-08-19 PROCEDURE — 80053 COMPREHEN METABOLIC PANEL: CPT

## 2023-08-19 PROCEDURE — 99024 POSTOP FOLLOW-UP VISIT: CPT

## 2023-08-19 PROCEDURE — 700105 HCHG RX REV CODE 258

## 2023-08-19 PROCEDURE — A9270 NON-COVERED ITEM OR SERVICE: HCPCS

## 2023-08-19 PROCEDURE — 85025 COMPLETE CBC W/AUTO DIFF WBC: CPT

## 2023-08-19 PROCEDURE — 700102 HCHG RX REV CODE 250 W/ 637 OVERRIDE(OP)

## 2023-08-19 PROCEDURE — 770001 HCHG ROOM/CARE - MED/SURG/GYN PRIV*

## 2023-08-19 PROCEDURE — 700111 HCHG RX REV CODE 636 W/ 250 OVERRIDE (IP)

## 2023-08-19 RX ORDER — SODIUM CHLORIDE 9 MG/ML
1000 INJECTION, SOLUTION INTRAVENOUS ONCE
Status: COMPLETED | OUTPATIENT
Start: 2023-08-19 | End: 2023-08-19

## 2023-08-19 RX ORDER — METRONIDAZOLE 500 MG/100ML
500 INJECTION, SOLUTION INTRAVENOUS EVERY 12 HOURS
Status: COMPLETED | OUTPATIENT
Start: 2023-08-19 | End: 2023-08-22

## 2023-08-19 RX ADMIN — ACETAMINOPHEN 1000 MG: 500 TABLET, FILM COATED ORAL at 12:56

## 2023-08-19 RX ADMIN — OXYCODONE HYDROCHLORIDE 10 MG: 10 TABLET ORAL at 13:19

## 2023-08-19 RX ADMIN — OXYCODONE HYDROCHLORIDE 10 MG: 10 TABLET ORAL at 23:11

## 2023-08-19 RX ADMIN — ACETAMINOPHEN 1000 MG: 500 TABLET, FILM COATED ORAL at 16:40

## 2023-08-19 RX ADMIN — SODIUM CHLORIDE 1000 ML: 9 INJECTION, SOLUTION INTRAVENOUS at 10:19

## 2023-08-19 RX ADMIN — OXYCODONE HYDROCHLORIDE 5 MG: 5 TABLET ORAL at 02:57

## 2023-08-19 RX ADMIN — OXYCODONE HYDROCHLORIDE 10 MG: 10 TABLET ORAL at 16:40

## 2023-08-19 RX ADMIN — CELECOXIB 200 MG: 200 CAPSULE ORAL at 04:44

## 2023-08-19 RX ADMIN — OXYCODONE HYDROCHLORIDE 10 MG: 10 TABLET ORAL at 06:52

## 2023-08-19 RX ADMIN — OXYCODONE HYDROCHLORIDE 10 MG: 10 TABLET ORAL at 20:11

## 2023-08-19 RX ADMIN — CEFTRIAXONE SODIUM 2000 MG: 10 INJECTION, POWDER, FOR SOLUTION INTRAVENOUS at 14:59

## 2023-08-19 RX ADMIN — ACETAMINOPHEN 1000 MG: 500 TABLET, FILM COATED ORAL at 04:44

## 2023-08-19 RX ADMIN — SENNOSIDES AND DOCUSATE SODIUM 2 TABLET: 50; 8.6 TABLET ORAL at 16:40

## 2023-08-19 RX ADMIN — METRONIDAZOLE 500 MG: 5 INJECTION, SOLUTION INTRAVENOUS at 13:00

## 2023-08-19 RX ADMIN — OXYCODONE HYDROCHLORIDE 10 MG: 10 TABLET ORAL at 10:15

## 2023-08-19 RX ADMIN — ACETAMINOPHEN 1000 MG: 500 TABLET, FILM COATED ORAL at 23:13

## 2023-08-19 ASSESSMENT — ENCOUNTER SYMPTOMS
NAUSEA: 0
FEVER: 0
MYALGIAS: 0
ABDOMINAL PAIN: 1
VOMITING: 0
CHILLS: 0

## 2023-08-19 ASSESSMENT — PAIN DESCRIPTION - PAIN TYPE
TYPE: ACUTE PAIN
TYPE: ACUTE PAIN
TYPE: ACUTE PAIN;SURGICAL PAIN
TYPE: ACUTE PAIN
TYPE: ACUTE PAIN;CHRONIC PAIN
TYPE: ACUTE PAIN;SURGICAL PAIN
TYPE: ACUTE PAIN;CHRONIC PAIN
TYPE: ACUTE PAIN
TYPE: ACUTE PAIN;SURGICAL PAIN
TYPE: ACUTE PAIN;SURGICAL PAIN
TYPE: ACUTE PAIN
TYPE: ACUTE PAIN;CHRONIC PAIN

## 2023-08-19 ASSESSMENT — COGNITIVE AND FUNCTIONAL STATUS - GENERAL
SUGGESTED CMS G CODE MODIFIER MOBILITY: CI
CLIMB 3 TO 5 STEPS WITH RAILING: A LITTLE
MOBILITY SCORE: 23
SUGGESTED CMS G CODE MODIFIER DAILY ACTIVITY: CI
DRESSING REGULAR LOWER BODY CLOTHING: A LITTLE
DAILY ACTIVITIY SCORE: 23

## 2023-08-19 NOTE — CARE PLAN
The patient is Stable - Low risk of patient condition declining or worsening    Shift Goals  Clinical Goals: pain control, rest, IS use  Patient Goals: pain control, rest    Progress made toward(s) clinical / shift goals:    Problem: Pain - Standard  Goal: Alleviation of pain or a reduction in pain to the patient’s comfort goal  Outcome: Progressing     Problem: Knowledge Deficit - Standard  Goal: Patient and family/care givers will demonstrate understanding of plan of care, disease process/condition, diagnostic tests and medications  Outcome: Progressing

## 2023-08-19 NOTE — PROGRESS NOTES
4 Eyes Skin Assessment Completed by SAKINA Lopez and SAKINA Gerber     Head WDL  Ears WDL  Nose WDL  Mouth WDL  Neck WDL  Breast/Chest WDL   Shoulder Blades WDL   Spine  WDL  (R) Arm/Elbow/Hand WDL  (L) Arm/Elbow/Hand WDL, PIV in place  Abdomen: 3 lap sites, dermabond. R ANALI  Groin WDL  Scrotum/Coccyx/Buttocks WDL  (R) Leg WDL  (L) Leg WDL  (R) Heel/Foot/Toe WDL  (L) Heel/Foot/Toe WDL              Devices In Places         Interventions In Place n/a        Pictures Uploaded Into Epic n/a  Wound Consult Placed n/a  RN Wound Prevention Protocol Ordered n/a

## 2023-08-19 NOTE — CARE PLAN
The patient is Stable - Low risk of patient condition declining or worsening    Shift Goals  Clinical Goals: pain control, voiding, tolerate diet, ANALI output  Patient Goals: pain control  Family Goals: not present    Progress made toward(s) clinical / shift goals: Pain controlled with oral and prn oral medication thus far, voiding, ANALI with output see I/Os, afebrile on IV antibiotics, tachycardia improved s/p 1L fluid bolus.  Tolerating diet encouraged to take it slow when restarting oral intake.     Patient is not progressing towards the following goals:

## 2023-08-19 NOTE — PROGRESS NOTES
"    DATE: 8/19/2023    Post Operative Day  1  laparoscopic cholecystectomy and intraoperative cholangiogram .    INTERVAL EVENTS:  Improved pain post surgery.  ANALI drain with 96 mL of bilious output.  Tolerating diet.  Tachycardic 110.    REVIEW OF SYSTEMS:  Review of Systems   Constitutional:  Negative for chills and fever.   Gastrointestinal:  Positive for abdominal pain (surgical incision site). Negative for nausea and vomiting.   Musculoskeletal:  Negative for myalgias.   All other systems reviewed and are negative.      PHYSICAL EXAMINATION:  Vital Signs: /71   Pulse (!) 101   Temp 37.4 °C (99.3 °F) (Temporal)   Resp 18   Ht 1.651 m (5' 5\")   Wt 73 kg (160 lb 15 oz)   SpO2 92%   Physical Exam  Vitals and nursing note reviewed.   Constitutional:       Appearance: Normal appearance. She is not ill-appearing.   Cardiovascular:      Rate and Rhythm: Tachycardia present.   Pulmonary:      Effort: Pulmonary effort is normal.   Abdominal:      General: Bowel sounds are normal. There is no distension.      Palpations: Abdomen is soft.      Tenderness: There is abdominal tenderness (appropriate surgical tenderness). There is no guarding.      Comments: ANALI with bilious output  Surgical sites well approximated with dermabond.   Skin:     General: Skin is warm and dry.   Neurological:      General: No focal deficit present.      Mental Status: She is alert.         LABORATORY VALUES:   Recent Labs     08/17/23  1201 08/18/23  0132 08/19/23  0105   WBC 25.5* 21.2* 28.2*   RBC 5.61* 5.05 4.31   HEMOGLOBIN 16.3* 14.6 12.7   HEMATOCRIT 48.9* 44.7 37.7   MCV 87.2 88.5 87.5   MCH 29.1 28.9 29.5   MCHC 33.3 32.7 33.7   RDW 40.9 41.9 41.2   PLATELETCT 344 336 286   MPV 9.5 9.6 9.5     Recent Labs     08/17/23  1201 08/18/23  0132 08/19/23  0105   SODIUM 136 137 134*   POTASSIUM 4.1 3.9 4.3   CHLORIDE 101 102 101   CO2 18* 23 26   GLUCOSE 126* 111* 128*   BUN 12 9 8   CREATININE 0.55 0.55 0.55   CALCIUM 9.4 8.4* 8.4* "     Recent Labs     08/17/23  1201 08/18/23  0132 08/19/23  0105   ASTSGOT 182* 73* 51*   ALTSGPT 685* 442* 274*   TBILIRUBIN 1.9* 1.4 1.1   ALKPHOSPHAT 178* 137* 103*   GLOBULIN 2.9 2.3 2.2         ASSESSMENT AND PLAN:   Fluid bolus x 1 for tachycardia.  Continue ANALI drain.  Advance diet as tolerated.  Mobilize.      Discussed patient condition with RN, Patient, and general surgery, Dr. Carrera.

## 2023-08-19 NOTE — CARE PLAN
The patient is Stable - Low risk of patient condition declining or worsening    Shift Goals  Clinical Goals: Pain control, void  Patient Goals: Pain control    Progress made toward(s) clinical / shift goals:  Pain medication administered PRN, patient able to sleep throughout the night intermittently    Patient is not progressing towards the following goals:

## 2023-08-19 NOTE — PROGRESS NOTES
Bedside report received from vaishnavi RN, assumed care at 1900.  Assessment complete.  A&O x 4. Patient calls appropriately.  Patient ambulates with standby assist.   Patient has 6/10 pain. Pain managed with prescribed medications.  Denies N&V. Tolerating full liquid diet.  Surgical lap sites x3, dermabond. R ANALI.  + void, - flatus, - BM.  Patient denies SOB. On 1L nasal cannula  SCD's on.  Patient calm, pleasant, and cooperative.  Review plan with of care with patient. Call light and personal belongings within reach. Hourly rounding in place. All needs met at this time.

## 2023-08-20 LAB
ALBUMIN SERPL BCP-MCNC: 3.4 G/DL (ref 3.2–4.9)
ALBUMIN/GLOB SERPL: 1.4 G/DL
ALP SERPL-CCNC: 105 U/L (ref 30–99)
ALT SERPL-CCNC: 191 U/L (ref 2–50)
ANION GAP SERPL CALC-SCNC: 12 MMOL/L (ref 7–16)
AST SERPL-CCNC: 42 U/L (ref 12–45)
BASOPHILS # BLD AUTO: 0.2 % (ref 0–1.8)
BASOPHILS # BLD: 0.03 K/UL (ref 0–0.12)
BILIRUB SERPL-MCNC: 0.7 MG/DL (ref 0.1–1.5)
BUN SERPL-MCNC: 6 MG/DL (ref 8–22)
CALCIUM ALBUM COR SERPL-MCNC: 8.8 MG/DL (ref 8.5–10.5)
CALCIUM SERPL-MCNC: 8.3 MG/DL (ref 8.5–10.5)
CHLORIDE SERPL-SCNC: 104 MMOL/L (ref 96–112)
CO2 SERPL-SCNC: 24 MMOL/L (ref 20–33)
CREAT SERPL-MCNC: 0.52 MG/DL (ref 0.5–1.4)
EOSINOPHIL # BLD AUTO: 0.19 K/UL (ref 0–0.51)
EOSINOPHIL NFR BLD: 1.2 % (ref 0–6.9)
ERYTHROCYTE [DISTWIDTH] IN BLOOD BY AUTOMATED COUNT: 42.5 FL (ref 35.9–50)
GFR SERPLBLD CREATININE-BSD FMLA CKD-EPI: 129 ML/MIN/1.73 M 2
GLOBULIN SER CALC-MCNC: 2.4 G/DL (ref 1.9–3.5)
GLUCOSE SERPL-MCNC: 98 MG/DL (ref 65–99)
HCT VFR BLD AUTO: 35.2 % (ref 37–47)
HGB BLD-MCNC: 11.5 G/DL (ref 12–16)
IMM GRANULOCYTES # BLD AUTO: 0.05 K/UL (ref 0–0.11)
IMM GRANULOCYTES NFR BLD AUTO: 0.3 % (ref 0–0.9)
LYMPHOCYTES # BLD AUTO: 3.71 K/UL (ref 1–4.8)
LYMPHOCYTES NFR BLD: 23.6 % (ref 22–41)
MAGNESIUM SERPL-MCNC: 1.9 MG/DL (ref 1.5–2.5)
MCH RBC QN AUTO: 29.1 PG (ref 27–33)
MCHC RBC AUTO-ENTMCNC: 32.7 G/DL (ref 32.2–35.5)
MCV RBC AUTO: 89.1 FL (ref 81.4–97.8)
MONOCYTES # BLD AUTO: 1.01 K/UL (ref 0–0.85)
MONOCYTES NFR BLD AUTO: 6.4 % (ref 0–13.4)
NEUTROPHILS # BLD AUTO: 10.71 K/UL (ref 1.82–7.42)
NEUTROPHILS NFR BLD: 68.3 % (ref 44–72)
NRBC # BLD AUTO: 0 K/UL
NRBC BLD-RTO: 0 /100 WBC (ref 0–0.2)
PLATELET # BLD AUTO: 295 K/UL (ref 164–446)
PMV BLD AUTO: 9.5 FL (ref 9–12.9)
POTASSIUM SERPL-SCNC: 3.6 MMOL/L (ref 3.6–5.5)
PROT SERPL-MCNC: 5.8 G/DL (ref 6–8.2)
RBC # BLD AUTO: 3.95 M/UL (ref 4.2–5.4)
SODIUM SERPL-SCNC: 140 MMOL/L (ref 135–145)
WBC # BLD AUTO: 15.7 K/UL (ref 4.8–10.8)

## 2023-08-20 PROCEDURE — 700111 HCHG RX REV CODE 636 W/ 250 OVERRIDE (IP)

## 2023-08-20 PROCEDURE — 85025 COMPLETE CBC W/AUTO DIFF WBC: CPT

## 2023-08-20 PROCEDURE — 83735 ASSAY OF MAGNESIUM: CPT

## 2023-08-20 PROCEDURE — 700111 HCHG RX REV CODE 636 W/ 250 OVERRIDE (IP): Mod: JZ | Performed by: SURGERY

## 2023-08-20 PROCEDURE — A9270 NON-COVERED ITEM OR SERVICE: HCPCS

## 2023-08-20 PROCEDURE — 770001 HCHG ROOM/CARE - MED/SURG/GYN PRIV*

## 2023-08-20 PROCEDURE — 36415 COLL VENOUS BLD VENIPUNCTURE: CPT

## 2023-08-20 PROCEDURE — 80053 COMPREHEN METABOLIC PANEL: CPT

## 2023-08-20 PROCEDURE — 700102 HCHG RX REV CODE 250 W/ 637 OVERRIDE(OP)

## 2023-08-20 PROCEDURE — 99024 POSTOP FOLLOW-UP VISIT: CPT

## 2023-08-20 RX ORDER — POTASSIUM CHLORIDE 20 MEQ/1
20 TABLET, EXTENDED RELEASE ORAL 2 TIMES DAILY
Status: COMPLETED | OUTPATIENT
Start: 2023-08-20 | End: 2023-08-21

## 2023-08-20 RX ORDER — MAGNESIUM SULFATE HEPTAHYDRATE 40 MG/ML
2 INJECTION, SOLUTION INTRAVENOUS ONCE
Status: COMPLETED | OUTPATIENT
Start: 2023-08-20 | End: 2023-08-20

## 2023-08-20 RX ORDER — ENOXAPARIN SODIUM 100 MG/ML
40 INJECTION SUBCUTANEOUS DAILY
Status: DISCONTINUED | OUTPATIENT
Start: 2023-08-20 | End: 2023-08-22 | Stop reason: HOSPADM

## 2023-08-20 RX ADMIN — METRONIDAZOLE 500 MG: 5 INJECTION, SOLUTION INTRAVENOUS at 14:01

## 2023-08-20 RX ADMIN — ACETAMINOPHEN 1000 MG: 500 TABLET, FILM COATED ORAL at 21:16

## 2023-08-20 RX ADMIN — ONDANSETRON 4 MG: 2 INJECTION INTRAMUSCULAR; INTRAVENOUS at 07:48

## 2023-08-20 RX ADMIN — OXYCODONE HYDROCHLORIDE 10 MG: 10 TABLET ORAL at 21:16

## 2023-08-20 RX ADMIN — CEFTRIAXONE SODIUM 2000 MG: 10 INJECTION, POWDER, FOR SOLUTION INTRAVENOUS at 13:54

## 2023-08-20 RX ADMIN — ACETAMINOPHEN 1000 MG: 500 TABLET, FILM COATED ORAL at 13:28

## 2023-08-20 RX ADMIN — SENNOSIDES AND DOCUSATE SODIUM 2 TABLET: 50; 8.6 TABLET ORAL at 17:08

## 2023-08-20 RX ADMIN — ONDANSETRON 4 MG: 2 INJECTION INTRAMUSCULAR; INTRAVENOUS at 21:22

## 2023-08-20 RX ADMIN — OXYCODONE HYDROCHLORIDE 5 MG: 5 TABLET ORAL at 14:01

## 2023-08-20 RX ADMIN — SENNOSIDES AND DOCUSATE SODIUM 2 TABLET: 50; 8.6 TABLET ORAL at 05:12

## 2023-08-20 RX ADMIN — OXYCODONE HYDROCHLORIDE 10 MG: 10 TABLET ORAL at 02:20

## 2023-08-20 RX ADMIN — METRONIDAZOLE 500 MG: 5 INJECTION, SOLUTION INTRAVENOUS at 01:09

## 2023-08-20 RX ADMIN — OXYCODONE HYDROCHLORIDE 10 MG: 10 TABLET ORAL at 09:01

## 2023-08-20 RX ADMIN — ENOXAPARIN SODIUM 40 MG: 100 INJECTION SUBCUTANEOUS at 17:08

## 2023-08-20 RX ADMIN — POTASSIUM CHLORIDE 20 MEQ: 1500 TABLET, EXTENDED RELEASE ORAL at 09:01

## 2023-08-20 RX ADMIN — OXYCODONE HYDROCHLORIDE 10 MG: 10 TABLET ORAL at 05:35

## 2023-08-20 RX ADMIN — MAGNESIUM SULFATE HEPTAHYDRATE 2 G: 2 INJECTION, SOLUTION INTRAVENOUS at 11:29

## 2023-08-20 RX ADMIN — ACETAMINOPHEN 1000 MG: 500 TABLET, FILM COATED ORAL at 05:12

## 2023-08-20 RX ADMIN — CELECOXIB 200 MG: 200 CAPSULE ORAL at 05:12

## 2023-08-20 RX ADMIN — OXYCODONE HYDROCHLORIDE 10 MG: 10 TABLET ORAL at 17:08

## 2023-08-20 RX ADMIN — POTASSIUM CHLORIDE 20 MEQ: 1500 TABLET, EXTENDED RELEASE ORAL at 17:08

## 2023-08-20 ASSESSMENT — PAIN DESCRIPTION - PAIN TYPE
TYPE: ACUTE PAIN
TYPE: SURGICAL PAIN;ACUTE PAIN
TYPE: ACUTE PAIN
TYPE: ACUTE PAIN;SURGICAL PAIN
TYPE: ACUTE PAIN
TYPE: ACUTE PAIN;SURGICAL PAIN

## 2023-08-20 ASSESSMENT — ENCOUNTER SYMPTOMS
NAUSEA: 0
FEVER: 0
VOMITING: 0
MYALGIAS: 0
ABDOMINAL PAIN: 1
CHILLS: 0

## 2023-08-20 NOTE — PROGRESS NOTES
Bedside report received from vaishnavi RN, assumed care at 1900.  Assessment complete.  A&O x 4. Patient calls appropriately.  Patient ambulates with standby assist.   Patient has 7/10 pain. Pain managed with prescribed medications.  Denies N&V. Tolerating regular diet.  Surgical lap sites x3, dermabond. R ANALI.  + void, + flatus, - BM.  Patient denies SOB. On room air  SCD's on.  Patient calm, pleasant, and cooperative.  Review plan with of care with patient. Call light and personal belongings within reach. Hourly rounding in place. All needs met at this time.

## 2023-08-20 NOTE — CARE PLAN
The patient is Stable - Low risk of patient condition declining or worsening    Shift Goals  Clinical Goals: Pain control  Patient Goals: Pain control  Family Goals: not present    Progress made toward(s) clinical / shift goals:  Pain medication administered PRN, patient able to sleep intermittently    Patient is not progressing towards the following goals:

## 2023-08-20 NOTE — CARE PLAN
The patient is Stable - Low risk of patient condition declining or worsening    Shift Goals  Clinical Goals: Pain control, rest, monitor vitals  Patient Goals: Rest  Family Goals: not present    Progress made toward(s) clinical / shift goals:  Pt resting. Pain managed w/ medication per MD order. VS stable at this time.     Patient is not progressing towards the following goals:

## 2023-08-20 NOTE — PROGRESS NOTES
Bedside report received from night shift nurse. Assumed care at 0645.   Pt A&Ox4  Tolerating regular diet, intermittent n/v. Hypoactive bowel sounds, passing flatus, LBM PTA. IV access through 22g LFA that is SL.  3x lap sites w/ DB, CDI. 1x ANALI to RLQ, serosang output, CDI.  Saturating >90% on RA.  Pt ambulates SBA.  Pain is controlled through medication orders. Updated on plan of care. Safety education provided. Bed locked in low. Call light within reach. Rounding in place.

## 2023-08-20 NOTE — PROGRESS NOTES
"    DATE: 8/20/2023    Post Operative Day 2 laparoscopic cholecystectomy and intraoperative cholangiogram .    INTERVAL EVENTS:  Feels better today.  ANALI drain with 105 mL   WBC 15.7 (28.2)  Improved heart rate.    REVIEW OF SYSTEMS:  Review of Systems   Constitutional:  Negative for chills and fever.   Gastrointestinal:  Positive for abdominal pain (surgical incision site). Negative for nausea and vomiting.   Musculoskeletal:  Negative for myalgias.   All other systems reviewed and are negative.      PHYSICAL EXAMINATION:  Vital Signs: /84   Pulse (!) 106   Temp 37.1 °C (98.8 °F) (Temporal)   Resp 16   Ht 1.651 m (5' 5\")   Wt 73 kg (160 lb 15 oz)   SpO2 96%   Physical Exam  Vitals and nursing note reviewed.   Constitutional:       Appearance: Normal appearance. She is not ill-appearing.   Cardiovascular:      Rate and Rhythm: Normal rate.   Pulmonary:      Effort: Pulmonary effort is normal.   Abdominal:      General: Bowel sounds are normal. There is no distension.      Palpations: Abdomen is soft.      Tenderness: There is abdominal tenderness (appropriate surgical tenderness). There is no guarding.      Comments: ANALI with serous sang/bilious output  Surgical sites well approximated with dermabond.   Skin:     General: Skin is warm and dry.   Neurological:      General: No focal deficit present.      Mental Status: She is alert.         LABORATORY VALUES:   Recent Labs     08/18/23  0132 08/19/23  0105 08/20/23  0042   WBC 21.2* 28.2* 15.7*   RBC 5.05 4.31 3.95*   HEMOGLOBIN 14.6 12.7 11.5*   HEMATOCRIT 44.7 37.7 35.2*   MCV 88.5 87.5 89.1   MCH 28.9 29.5 29.1   MCHC 32.7 33.7 32.7   RDW 41.9 41.2 42.5   PLATELETCT 336 286 295   MPV 9.6 9.5 9.5     Recent Labs     08/18/23  0132 08/19/23  0105 08/20/23  0042   SODIUM 137 134* 140   POTASSIUM 3.9 4.3 3.6   CHLORIDE 102 101 104   CO2 23 26 24   GLUCOSE 111* 128* 98   BUN 9 8 6*   CREATININE 0.55 0.55 0.52   CALCIUM 8.4* 8.4* 8.3*     Recent Labs     " 08/18/23  0132 08/19/23  0105 08/20/23  0042   ASTSGOT 73* 51* 42   ALTSGPT 442* 274* 191*   TBILIRUBIN 1.4 1.1 0.7   ALKPHOSPHAT 137* 103* 105*   GLOBULIN 2.3 2.2 2.4                    DVT Prophylaxis: Enoxaparin (Lovenox)              ASSESSMENT AND PLAN:   Continue ANALI drain.  Electrolyte replacement.  Mobilize.      Discussed patient condition with RN, Patient, and general surgery, Dr. Carrera.

## 2023-08-21 LAB
ALBUMIN SERPL BCP-MCNC: 3.2 G/DL (ref 3.2–4.9)
ALBUMIN/GLOB SERPL: 1.3 G/DL
ALP SERPL-CCNC: 161 U/L (ref 30–99)
ALT SERPL-CCNC: 179 U/L (ref 2–50)
ANION GAP SERPL CALC-SCNC: 7 MMOL/L (ref 7–16)
AST SERPL-CCNC: 53 U/L (ref 12–45)
BASOPHILS # BLD AUTO: 0.5 % (ref 0–1.8)
BASOPHILS # BLD: 0.05 K/UL (ref 0–0.12)
BILIRUB SERPL-MCNC: 0.5 MG/DL (ref 0.1–1.5)
BUN SERPL-MCNC: 5 MG/DL (ref 8–22)
CALCIUM ALBUM COR SERPL-MCNC: 9.1 MG/DL (ref 8.5–10.5)
CALCIUM SERPL-MCNC: 8.5 MG/DL (ref 8.5–10.5)
CHLORIDE SERPL-SCNC: 104 MMOL/L (ref 96–112)
CO2 SERPL-SCNC: 28 MMOL/L (ref 20–33)
CREAT SERPL-MCNC: 0.6 MG/DL (ref 0.5–1.4)
EOSINOPHIL # BLD AUTO: 0.27 K/UL (ref 0–0.51)
EOSINOPHIL NFR BLD: 2.5 % (ref 0–6.9)
ERYTHROCYTE [DISTWIDTH] IN BLOOD BY AUTOMATED COUNT: 43.2 FL (ref 35.9–50)
GFR SERPLBLD CREATININE-BSD FMLA CKD-EPI: 125 ML/MIN/1.73 M 2
GLOBULIN SER CALC-MCNC: 2.5 G/DL (ref 1.9–3.5)
GLUCOSE SERPL-MCNC: 96 MG/DL (ref 65–99)
HCT VFR BLD AUTO: 38.8 % (ref 37–47)
HGB BLD-MCNC: 12.5 G/DL (ref 12–16)
IMM GRANULOCYTES # BLD AUTO: 0.03 K/UL (ref 0–0.11)
IMM GRANULOCYTES NFR BLD AUTO: 0.3 % (ref 0–0.9)
LYMPHOCYTES # BLD AUTO: 3.43 K/UL (ref 1–4.8)
LYMPHOCYTES NFR BLD: 32.1 % (ref 22–41)
MAGNESIUM SERPL-MCNC: 2 MG/DL (ref 1.5–2.5)
MCH RBC QN AUTO: 28.8 PG (ref 27–33)
MCHC RBC AUTO-ENTMCNC: 32.2 G/DL (ref 32.2–35.5)
MCV RBC AUTO: 89.4 FL (ref 81.4–97.8)
MONOCYTES # BLD AUTO: 0.61 K/UL (ref 0–0.85)
MONOCYTES NFR BLD AUTO: 5.7 % (ref 0–13.4)
NEUTROPHILS # BLD AUTO: 6.31 K/UL (ref 1.82–7.42)
NEUTROPHILS NFR BLD: 58.9 % (ref 44–72)
NRBC # BLD AUTO: 0 K/UL
NRBC BLD-RTO: 0 /100 WBC (ref 0–0.2)
PHOSPHATE SERPL-MCNC: 3.4 MG/DL (ref 2.5–4.5)
PLATELET # BLD AUTO: 358 K/UL (ref 164–446)
PMV BLD AUTO: 9.7 FL (ref 9–12.9)
POTASSIUM SERPL-SCNC: 4.3 MMOL/L (ref 3.6–5.5)
PROT SERPL-MCNC: 5.7 G/DL (ref 6–8.2)
RBC # BLD AUTO: 4.34 M/UL (ref 4.2–5.4)
SODIUM SERPL-SCNC: 139 MMOL/L (ref 135–145)
WBC # BLD AUTO: 10.7 K/UL (ref 4.8–10.8)

## 2023-08-21 PROCEDURE — 700111 HCHG RX REV CODE 636 W/ 250 OVERRIDE (IP): Mod: JZ | Performed by: SURGERY

## 2023-08-21 PROCEDURE — 85025 COMPLETE CBC W/AUTO DIFF WBC: CPT

## 2023-08-21 PROCEDURE — 84100 ASSAY OF PHOSPHORUS: CPT

## 2023-08-21 PROCEDURE — 700102 HCHG RX REV CODE 250 W/ 637 OVERRIDE(OP)

## 2023-08-21 PROCEDURE — 80053 COMPREHEN METABOLIC PANEL: CPT

## 2023-08-21 PROCEDURE — 700111 HCHG RX REV CODE 636 W/ 250 OVERRIDE (IP)

## 2023-08-21 PROCEDURE — 770001 HCHG ROOM/CARE - MED/SURG/GYN PRIV*

## 2023-08-21 PROCEDURE — 99024 POSTOP FOLLOW-UP VISIT: CPT

## 2023-08-21 PROCEDURE — 83735 ASSAY OF MAGNESIUM: CPT

## 2023-08-21 PROCEDURE — 700101 HCHG RX REV CODE 250

## 2023-08-21 PROCEDURE — A9270 NON-COVERED ITEM OR SERVICE: HCPCS

## 2023-08-21 RX ADMIN — POTASSIUM CHLORIDE 20 MEQ: 1500 TABLET, EXTENDED RELEASE ORAL at 16:25

## 2023-08-21 RX ADMIN — Medication 1 APPLICATOR: at 05:40

## 2023-08-21 RX ADMIN — METRONIDAZOLE 500 MG: 5 INJECTION, SOLUTION INTRAVENOUS at 23:53

## 2023-08-21 RX ADMIN — BISACODYL 10 MG: 10 SUPPOSITORY RECTAL at 13:51

## 2023-08-21 RX ADMIN — ACETAMINOPHEN 1000 MG: 500 TABLET, FILM COATED ORAL at 05:38

## 2023-08-21 RX ADMIN — ONDANSETRON 4 MG: 2 INJECTION INTRAMUSCULAR; INTRAVENOUS at 05:39

## 2023-08-21 RX ADMIN — MAGNESIUM HYDROXIDE 30 ML: 1200 LIQUID ORAL at 05:39

## 2023-08-21 RX ADMIN — ONDANSETRON 4 MG: 2 INJECTION INTRAMUSCULAR; INTRAVENOUS at 17:19

## 2023-08-21 RX ADMIN — POTASSIUM CHLORIDE 20 MEQ: 1500 TABLET, EXTENDED RELEASE ORAL at 05:39

## 2023-08-21 RX ADMIN — METRONIDAZOLE 500 MG: 5 INJECTION, SOLUTION INTRAVENOUS at 12:32

## 2023-08-21 RX ADMIN — CEFTRIAXONE SODIUM 2000 MG: 10 INJECTION, POWDER, FOR SOLUTION INTRAVENOUS at 12:26

## 2023-08-21 RX ADMIN — ENOXAPARIN SODIUM 40 MG: 100 INJECTION SUBCUTANEOUS at 16:26

## 2023-08-21 RX ADMIN — OXYCODONE HYDROCHLORIDE 10 MG: 10 TABLET ORAL at 01:40

## 2023-08-21 RX ADMIN — ACETAMINOPHEN 1000 MG: 500 TABLET, FILM COATED ORAL at 12:26

## 2023-08-21 RX ADMIN — ACETAMINOPHEN 1000 MG: 500 TABLET, FILM COATED ORAL at 23:50

## 2023-08-21 RX ADMIN — Medication 1 APPLICATOR: at 16:26

## 2023-08-21 RX ADMIN — OXYCODONE HYDROCHLORIDE 5 MG: 5 TABLET ORAL at 23:54

## 2023-08-21 RX ADMIN — SENNOSIDES AND DOCUSATE SODIUM 2 TABLET: 50; 8.6 TABLET ORAL at 16:24

## 2023-08-21 RX ADMIN — OXYCODONE HYDROCHLORIDE 5 MG: 5 TABLET ORAL at 13:54

## 2023-08-21 RX ADMIN — OXYCODONE HYDROCHLORIDE 10 MG: 10 TABLET ORAL at 05:38

## 2023-08-21 RX ADMIN — ACETAMINOPHEN 1000 MG: 500 TABLET, FILM COATED ORAL at 16:24

## 2023-08-21 RX ADMIN — ONDANSETRON 4 MG: 2 INJECTION INTRAMUSCULAR; INTRAVENOUS at 01:40

## 2023-08-21 RX ADMIN — SENNOSIDES AND DOCUSATE SODIUM 2 TABLET: 50; 8.6 TABLET ORAL at 05:39

## 2023-08-21 RX ADMIN — METRONIDAZOLE 500 MG: 5 INJECTION, SOLUTION INTRAVENOUS at 01:47

## 2023-08-21 RX ADMIN — OXYCODONE HYDROCHLORIDE 5 MG: 5 TABLET ORAL at 17:22

## 2023-08-21 RX ADMIN — OXYCODONE HYDROCHLORIDE 10 MG: 10 TABLET ORAL at 09:47

## 2023-08-21 RX ADMIN — CELECOXIB 200 MG: 200 CAPSULE ORAL at 05:39

## 2023-08-21 RX ADMIN — OXYCODONE HYDROCHLORIDE 5 MG: 5 TABLET ORAL at 20:33

## 2023-08-21 ASSESSMENT — ENCOUNTER SYMPTOMS
NAUSEA: 1
CHILLS: 0
FEVER: 0
ABDOMINAL PAIN: 0
MYALGIAS: 0
VOMITING: 0

## 2023-08-21 ASSESSMENT — PAIN DESCRIPTION - PAIN TYPE
TYPE: ACUTE PAIN;SURGICAL PAIN

## 2023-08-21 NOTE — PROGRESS NOTES
Report received from RN, assumed care at 0715  Pt is A0X4, and responds appropriately   Pt declines any SOB, chest pain, new onset of numbness/ tingling  Pt rates pain at 7/10, on a scale of 1-10, pt medicated per MAR  Pt is voiding adequatly and without hesitancy  Pt has hypoactive bowel sounds, last BM PTA  Pt ambulates independently   Pt is tolerating a diet, pt denies any nausea/vomiting at this time  RLQ ANALI  Plan of care discussed, all questions answered. Explained importance of calling before getting OOB and pt verbalizes understanding. Explained importance of oral care. Call light is within reach, treaded slipper socks on, bed in lowest/ locked position, hourly rounding in place, all needs met at this time

## 2023-08-21 NOTE — CARE PLAN
The patient is Stable - Low risk of patient condition declining or worsening    Shift Goals  Clinical Goals: pain control, nausea control, ambulate, drain care, BM  Patient Goals: pain control, discharge  Family Goals: not present    Progress made toward(s) clinical / shift goals:    Problem: Pain - Standard  Goal: Alleviation of pain or a reduction in pain to the patient’s comfort goal  Outcome: Progressing     Problem: Knowledge Deficit - Standard  Goal: Patient and family/care givers will demonstrate understanding of plan of care, disease process/condition, diagnostic tests and medications  Outcome: Progressing

## 2023-08-21 NOTE — DOCUMENTATION QUERY
"                                                                         [facility]                                                                       Query Response Note      PATIENT:               CHARY SÁNCHEZ  ACCT #:                  [id number]  MRN:                     [id number]  :                      1995  ADMIT DATE:       2023 12:44 PM  DISCH DATE:          RESPONDING  PROVIDER #:        [id number]           QUERY TEXT:    The diagnosis of sepsis has been documented in the H&P. Per H&P, \"appears to be cholangitis and possible cholecystitis and mild sepsis with white count of 25 but no hemodynamic effects at this time\".    Please clarify the status of sepsis after study:    The patient's Clinical Indicators include:  H&P: \"presents with what appears to be cholangitis and possible cholecystitis and mild sepsis with white count of 25 but no hemodynamic effects at this time.\"     Labs: WBC 25.5, , CR 0.55, Total Bilirubin 1.9, Lactic Acid 1.1   Vitals: HR , RR 18-24, MAP , % on 2L NC    Risk Factors: Cholecystitis  Treatment: IV Rocephin/Flagyl, IV fluids/bolus    Thank You,  Kita Dumont RN  Clinical    Connect via [employer] or [email or web address]  Options provided:   -- Sepsis ruled in, (please specify sepsis-related organ dysfunction)   -- Sepsis ruled out   -- Other explanation, (please specify other explanation)   -- Unable to determine      Query created by: Kita Dumont on 2023 11:50 AM    RESPONSE TEXT:    Unable to determine          Electronically signed by:  MALIA CLIFFORD DO 2023 4:30 PM              "

## 2023-08-21 NOTE — PROGRESS NOTES
"    DATE: 8/21/2023    Post Operative Day 3 laparoscopic cholecystectomy and intraoperative cholangiogram .    INTERVAL EVENTS:  Nauseated since last night.  WBC 10.7 (15.7).  ANALI drain with 80 cc serosang output.  +flatus, denies BM    REVIEW OF SYSTEMS:  Review of Systems   Constitutional:  Negative for chills and fever.   Gastrointestinal:  Positive for nausea. Negative for abdominal pain and vomiting.   Musculoskeletal:  Negative for myalgias.   All other systems reviewed and are negative.      PHYSICAL EXAMINATION:  Vital Signs: BP (!) 145/94   Pulse 97   Temp 37.4 °C (99.3 °F) (Temporal)   Resp 17   Ht 1.651 m (5' 5\")   Wt 73 kg (160 lb 15 oz)   SpO2 92%   Physical Exam  Vitals and nursing note reviewed.   Constitutional:       Appearance: Normal appearance. She is not ill-appearing.   Cardiovascular:      Rate and Rhythm: Normal rate.   Pulmonary:      Effort: Pulmonary effort is normal.   Abdominal:      General: There is no distension.      Palpations: Abdomen is soft.      Tenderness: There is no abdominal tenderness. There is no guarding.      Comments: ANALI with serous serosang output  Surgical sites well approximated with dermabond.   Skin:     General: Skin is warm and dry.   Neurological:      General: No focal deficit present.      Mental Status: She is alert.         LABORATORY VALUES:   Recent Labs     08/19/23 0105 08/20/23 0042 08/21/23 0220   WBC 28.2* 15.7* 10.7   RBC 4.31 3.95* 4.34   HEMOGLOBIN 12.7 11.5* 12.5   HEMATOCRIT 37.7 35.2* 38.8   MCV 87.5 89.1 89.4   MCH 29.5 29.1 28.8   MCHC 33.7 32.7 32.2   RDW 41.2 42.5 43.2   PLATELETCT 286 295 358   MPV 9.5 9.5 9.7     Recent Labs     08/19/23 0105 08/20/23 0042 08/21/23 0220   SODIUM 134* 140 139   POTASSIUM 4.3 3.6 4.3   CHLORIDE 101 104 104   CO2 26 24 28   GLUCOSE 128* 98 96   BUN 8 6* 5*   CREATININE 0.55 0.52 0.60   CALCIUM 8.4* 8.3* 8.5     Recent Labs     08/19/23 0105 08/20/23 0042 08/21/23 0220   [facility] 51* 42 53* "   ALTSGPT 274* 191* 179*   TBILIRUBIN 1.1 0.7 0.5   ALKPHOSPHAT 103* 105* 161*   GLOBULIN 2.2 2.4 2.5                 DVT Prophylaxis: Enoxaparin (Lovenox)              ASSESSMENT AND PLAN:   Continue ANALI drain.  No BM, maybe cause of nausea ? Suggest suppository.  Mobilize.      Discussed patient condition with RN, Patient, and general surgery, Dr. Hernandez.

## 2023-08-21 NOTE — PROGRESS NOTES
Pt is AAxOx4. Pt is on room air.  Denies SOB/chest pain/tingling or numbness  Pt verbalizes acute pain; PRN pain meds in use per MAR.  Skin per flowsheets. x4 lap sites   RLQ ANALI; drg changed and CDI.  +nausea without emesis. Tolerating regular diet.  +void. LBM 8/16 PTA hypoactive BS.  Pt ambulates independently.  SCDs refused. Lovenox in use for VTE prophylaxis.

## 2023-08-22 ENCOUNTER — PHARMACY VISIT (OUTPATIENT)
Dept: PHARMACY | Facility: MEDICAL CENTER | Age: 28
End: 2023-08-22
Payer: COMMERCIAL

## 2023-08-22 VITALS
SYSTOLIC BLOOD PRESSURE: 122 MMHG | BODY MASS INDEX: 26.81 KG/M2 | HEIGHT: 65 IN | TEMPERATURE: 97.9 F | HEART RATE: 91 BPM | OXYGEN SATURATION: 96 % | RESPIRATION RATE: 16 BRPM | DIASTOLIC BLOOD PRESSURE: 86 MMHG | WEIGHT: 160.94 LBS

## 2023-08-22 LAB
ALBUMIN SERPL BCP-MCNC: 3.4 G/DL (ref 3.2–4.9)
ALBUMIN/GLOB SERPL: 1.3 G/DL
ALP SERPL-CCNC: 202 U/L (ref 30–99)
ALT SERPL-CCNC: 174 U/L (ref 2–50)
ANION GAP SERPL CALC-SCNC: 11 MMOL/L (ref 7–16)
AST SERPL-CCNC: 53 U/L (ref 12–45)
BACTERIA BLD CULT: NORMAL
BACTERIA BLD CULT: NORMAL
BASOPHILS # BLD AUTO: 0.4 % (ref 0–1.8)
BASOPHILS # BLD: 0.04 K/UL (ref 0–0.12)
BILIRUB SERPL-MCNC: 0.6 MG/DL (ref 0.1–1.5)
BUN SERPL-MCNC: 7 MG/DL (ref 8–22)
CALCIUM ALBUM COR SERPL-MCNC: 9.6 MG/DL (ref 8.5–10.5)
CALCIUM SERPL-MCNC: 9.1 MG/DL (ref 8.5–10.5)
CHLORIDE SERPL-SCNC: 102 MMOL/L (ref 96–112)
CO2 SERPL-SCNC: 25 MMOL/L (ref 20–33)
CREAT SERPL-MCNC: 0.6 MG/DL (ref 0.5–1.4)
EOSINOPHIL # BLD AUTO: 0.32 K/UL (ref 0–0.51)
EOSINOPHIL NFR BLD: 3.3 % (ref 0–6.9)
ERYTHROCYTE [DISTWIDTH] IN BLOOD BY AUTOMATED COUNT: 42.2 FL (ref 35.9–50)
GFR SERPLBLD CREATININE-BSD FMLA CKD-EPI: 125 ML/MIN/1.73 M 2
GLOBULIN SER CALC-MCNC: 2.6 G/DL (ref 1.9–3.5)
GLUCOSE SERPL-MCNC: 91 MG/DL (ref 65–99)
HCT VFR BLD AUTO: 37.6 % (ref 37–47)
HGB BLD-MCNC: 12.5 G/DL (ref 12–16)
IMM GRANULOCYTES # BLD AUTO: 0.03 K/UL (ref 0–0.11)
IMM GRANULOCYTES NFR BLD AUTO: 0.3 % (ref 0–0.9)
LYMPHOCYTES # BLD AUTO: 3.34 K/UL (ref 1–4.8)
LYMPHOCYTES NFR BLD: 34.5 % (ref 22–41)
MCH RBC QN AUTO: 29.1 PG (ref 27–33)
MCHC RBC AUTO-ENTMCNC: 33.2 G/DL (ref 32.2–35.5)
MCV RBC AUTO: 87.4 FL (ref 81.4–97.8)
MONOCYTES # BLD AUTO: 0.53 K/UL (ref 0–0.85)
MONOCYTES NFR BLD AUTO: 5.5 % (ref 0–13.4)
NEUTROPHILS # BLD AUTO: 5.42 K/UL (ref 1.82–7.42)
NEUTROPHILS NFR BLD: 56 % (ref 44–72)
NRBC # BLD AUTO: 0 K/UL
NRBC BLD-RTO: 0 /100 WBC (ref 0–0.2)
PLATELET # BLD AUTO: 391 K/UL (ref 164–446)
PMV BLD AUTO: 9.2 FL (ref 9–12.9)
POTASSIUM SERPL-SCNC: 4.5 MMOL/L (ref 3.6–5.5)
PROT SERPL-MCNC: 6 G/DL (ref 6–8.2)
RBC # BLD AUTO: 4.3 M/UL (ref 4.2–5.4)
SIGNIFICANT IND 70042: NORMAL
SIGNIFICANT IND 70042: NORMAL
SITE SITE: NORMAL
SITE SITE: NORMAL
SODIUM SERPL-SCNC: 138 MMOL/L (ref 135–145)
SOURCE SOURCE: NORMAL
SOURCE SOURCE: NORMAL
WBC # BLD AUTO: 9.7 K/UL (ref 4.8–10.8)

## 2023-08-22 PROCEDURE — RXMED WILLOW AMBULATORY MEDICATION CHARGE

## 2023-08-22 PROCEDURE — A9270 NON-COVERED ITEM OR SERVICE: HCPCS

## 2023-08-22 PROCEDURE — 85025 COMPLETE CBC W/AUTO DIFF WBC: CPT

## 2023-08-22 PROCEDURE — 700102 HCHG RX REV CODE 250 W/ 637 OVERRIDE(OP)

## 2023-08-22 PROCEDURE — 80053 COMPREHEN METABOLIC PANEL: CPT

## 2023-08-22 PROCEDURE — 99024 POSTOP FOLLOW-UP VISIT: CPT

## 2023-08-22 RX ORDER — AMOXICILLIN 250 MG
2 CAPSULE ORAL 2 TIMES DAILY
Qty: 30 TABLET | Refills: 0 | COMMUNITY
Start: 2023-08-22

## 2023-08-22 RX ORDER — OXYCODONE HYDROCHLORIDE 5 MG/1
5 TABLET ORAL EVERY 4 HOURS PRN
Qty: 12 TABLET | Refills: 0 | Status: SHIPPED | OUTPATIENT
Start: 2023-08-22 | End: 2023-08-24

## 2023-08-22 RX ORDER — ACETAMINOPHEN 500 MG
1000 TABLET ORAL EVERY 6 HOURS
Qty: 30 TABLET | Refills: 0 | COMMUNITY
Start: 2023-08-22

## 2023-08-22 RX ADMIN — ACETAMINOPHEN 1000 MG: 500 TABLET, FILM COATED ORAL at 06:14

## 2023-08-22 RX ADMIN — SENNOSIDES AND DOCUSATE SODIUM 2 TABLET: 50; 8.6 TABLET ORAL at 06:15

## 2023-08-22 RX ADMIN — Medication 1 APPLICATOR: at 06:15

## 2023-08-22 RX ADMIN — CELECOXIB 200 MG: 200 CAPSULE ORAL at 06:15

## 2023-08-22 RX ADMIN — OXYCODONE HYDROCHLORIDE 5 MG: 5 TABLET ORAL at 06:17

## 2023-08-22 RX ADMIN — ACETAMINOPHEN 1000 MG: 500 TABLET, FILM COATED ORAL at 12:50

## 2023-08-22 ASSESSMENT — PAIN DESCRIPTION - PAIN TYPE
TYPE: ACUTE PAIN;SURGICAL PAIN

## 2023-08-22 NOTE — PROGRESS NOTES
"  DATE: 8/22/2023    Post Operative Day 4 laparoscopic cholecystectomy and intraoperative cholangiogram .    INTERVAL EVENTS:  Mariia Samayoa is awake alert and appropriate friendly cooperative  She reports feeling much better today  She states nausea resolved  Pain well controlled  She reports she would like to go home        PHYSICAL EXAMINATION:  Vital Signs: /86   Pulse 91   Temp 36.6 °C (97.9 °F) (Temporal)   Resp 16   Ht 1.651 m (5' 5\")   Wt 73 kg (160 lb 15 oz)   SpO2 96%   Physical Exam  Constitutional:       Appearance: Normal appearance. She is not ill-appearing.   Cardiovascular:      Rate and Rhythm: Normal rate.   Pulmonary:      Effort: Pulmonary effort is normal.   Abdominal:      General: There is no distension.      Palpations: Abdomen is soft.      Tenderness: There is no abdominal tenderness. There is no guarding.      Comments: ANALI with serous serosang output  Surgical sites well approximated with dermabond.   Skin:     General: Skin is warm and dry.   Neurological:      General: No focal deficit present.      Mental Status: She is alert.   LABORATORY VALUES:  Recent Labs     08/20/23 0042 08/21/23 0220 08/22/23 0042   WBC 15.7* 10.7 9.7   RBC 3.95* 4.34 4.30   HEMOGLOBIN 11.5* 12.5 12.5   HEMATOCRIT 35.2* 38.8 37.6   MCV 89.1 89.4 87.4   MCH 29.1 28.8 29.1   MCHC 32.7 32.2 33.2   RDW 42.5 43.2 42.2   PLATELETCT 295 358 391   MPV 9.5 9.7 9.2     Recent Labs     08/20/23 0042 08/21/23 0220 08/22/23 0042   SODIUM 140 139 138   POTASSIUM 3.6 4.3 4.5   CHLORIDE 104 104 102   CO2 24 28 25   GLUCOSE 98 96 91   BUN 6* 5* 7*   CREATININE 0.52 0.60 0.60   CALCIUM 8.3* 8.5 9.1     Recent Labs     08/20/23 0042 08/21/23 0220 08/22/23 0042   ASTSGOT 42 53* 53*   ALTSGPT 191* 179* 174*   TBILIRUBIN 0.7 0.5 0.6   ALKPHOSPHAT 105* 161* 202*   GLOBULIN 2.4 2.5 2.6           IMAGING:  [id number]   Final Result      Portable fluoroscopy as described.      [id number]   Final " Result         1. No choledocholithiasis. No CBD dilatation.      2. No gallstone identified. Gallbladder wall thickening and pericholecystic fluid, nonspecific .      3. Sequela of chronic pancreatitis.      4. Fluid in the right upper quadrant and right lower quadrant. Mild wall thickening of the ascending colon could be reactive change or primary colitis.      US-RUQ   Final Result      1.  Distended gallbladder with wall thickening, gallbladder sludge and trace pericholecystic fluid is suspicious for acute cholecystitis.   2.  There is mild biliary dilatation.      CT-ABDOMEN-PELVIS WITH   Final Result      1.  Findings suspicious for acute cholecystitis though no radiodense stones are seen within the gallbladder   2.  Coarse calcifications in the pancreas, likely the residua of chronic pancreatitis   3.  Possible 5 mm choledocholith though this could be pancreatic parenchymal calcification. Further assessment might be performed with MRCP.   4.  Dilation of the pancreatic duct could be related to chronic pancreatitis however ductal obstruction from stone is not excluded.   5.  Small amount of fluid in the RIGHT paracolic gutter   6.  Changes in the ascending colon are likely reactive to adjacent inflammatory changes or artifactual and related to segmental underdistention rather than related to colitis from other cause          ASSESSMENT AND PLAN:    Recovering well  Plan drain removal  Diet as tolerated  Plan towards discharge home           ____________________________________     Tulio Hernandez M.D.    DD: 8/22/2023  12:01 PM

## 2023-08-22 NOTE — DISCHARGE INSTRUCTIONS
Post Operative Discharge Instructions:    1. DIET: Upon discharge from the hospital, you may resume your normal preoperative diet, unless specifically directed otherwise. Depending on how you are feeling and whether you have nausea or not, you may wish to stay with a bland diet for the first few days. However, you can advance this as quickly as you feel ready.    2. ACTIVITIES: After discharge from the hospital, you may resume full routine activities; however, there should be no heavy lifting (greater than 20 pounds or a bag of groceries) and no strenuous activities for at least 2 weeks. The duration may be longer, depending on your surgical procedure. Routine activities of daily living are acceptable. Activity level should be addressed at your post-op follow up appointment.    3. DRIVING: You may drive whenever you are off pain medications and are able to perform the activities needed to drive, i.e., turning, bending, twisting, etc.    4. BATHING: You may get the wound wet at any time after leaving the hospital. You may shower, but do not submerge in a bath for at least two weeks.  If you have wound dressings, they may come off after 48 hours.  If you have skin glue to the wound, this will fall off on its own, do not pick at it.  If you have Steri-Strips to the wound, these will fall off on their own, do not pick at them. You may trim the edges if needed.    5. BOWEL FUNCTION:   After surgery, it is not uncommon for patients to develop either frequent or loose stools after meals. This usually corrects itself after a few days, to a few weeks. If this occurs, do not worry; this will resolve on its own.  Constipation is much more common than loose stools. The cause is the combination of pain medication and decreased activity level and possibly the nature of the surgical procedure performed. If you feel this is occurring, you may use an over-the-counter treatment such as MiraLAX (or Milk of Magnesia, Ex-Lax,  Senokot, etc.) until the problem has resolved. Drink plenty of water and try to wean off narcotic pain medications as soon as is comfortable for you.    6. PAIN MEDICATION:   You will be given a prescription for pain medication at discharge. Please take these as directed. It is important to remember not to take medications on an empty stomach as this may cause nausea.  You may also take over the counter acetaminophen and/or NSAIDS (ibuprofen, Aleve, Advil, Motrin) per the package instructions.  You may also use ice to the wound to decrease pain and swelling. You may alternate 20 minutes on and 20 minutes off with the ice for the first 24-48 hours. Make sure you place a washcloth or towel between the ice pack and your skin.  Please note that narcotic pain medication cannot be refilled unless you are seen by a doctor. Make sure you call the office if you are running low on medication or if the dose you have been prescribed is not working well for you.    7.CALL THE [facility] AT [phone] IF YOU HAVE:  (1) Fevers to more than 101F, (2) Unusual chest or leg pain, (3) Drainage or fluid from incision that may be foul smelling, increased tenderness or soreness at the wound or the wound edges are no longer together, redness or swelling at the incision site. Do not hesitate to call with any other questions.

## 2023-08-22 NOTE — PROGRESS NOTES
Bedside report received.  Assessment complete.  A&O x 4. Patient calls appropriately.  Patient is up self  Patient has 5/10 pain. Pain managed with prescribed medications.  Denies N&V. Tolerating regular diet.  3 lap sites, CDI. RUQ ANALI with small serosang output, dressing CDI.  + void, + flatus, - BM last BM 8/21  Patient denies SOB.  Review plan with of care with patient. Call light and personal belongings within reach. Hourly rounding in place. All needs met at this time.

## 2023-08-22 NOTE — PROGRESS NOTES
Patient discharged home.  Discussed discharge instructions with patient, patient voiced understanding.  PIV removed by floor RN.  Meds to beds and all personal belongings sent with patient.

## 2023-08-22 NOTE — CARE PLAN
The patient is Stable - Low risk of patient condition declining or worsening    Shift Goals  Clinical Goals: Discharge Home  Patient Goals: Discharge Home  Family Goals: not present    Progress made toward(s) clinical / shift goals:       Problem: Pain - Standard  Goal: Alleviation of pain or a reduction in pain to the patient’s comfort goal  Outcome: Progressing     Problem: Knowledge Deficit - Standard  Goal: Patient and family/care givers will demonstrate understanding of plan of care, disease process/condition, diagnostic tests and medications  Outcome: Progressing

## 2023-08-22 NOTE — CARE PLAN
The patient is Stable - Low risk of patient condition declining or worsening    Shift Goals  Clinical Goals: pain control, monitor for nausea  Patient Goals: pain control, rest    Progress made toward(s) clinical / shift goals:  Pain being controlled with PRN pain medication per MAR and rest. Pt has not complained of nausea and tolerated dinner tray.    Patient is not progressing towards the following goals:

## 2023-08-22 NOTE — PROGRESS NOTES
Bedside report received, assessment completed    A&O x  4, pt calls appropriately  Mobility: Up self, FWW  Fall Risk Assessment: n/a, bed alarm n/a, door notifications yes  Pain Assessment / Reassessment completed, medication provided per MAR  Diet: Regular, tolerating   LDA:   IV Access: 22 LFA, CDI/ flushed/ SL  GI/: + void, + flatus,  8/21 BM  DVT Prophylaxis: lovenox, SCD on while in bed   Emmett Score: 22, Interventions per flow sheet  Procedures:    - 8/18 Lap cholecystectomy   D/C Plan:    - home pending medical/ surgical clearance     Reviewed plan of care with patient, bed in lowest position and locked, pt resting comfortably now, call light within reach, all needs met at this time. Interventions will be executed per plan of care

## 2023-08-22 NOTE — DISCHARGE SUMMARY
DISCHARGE  SUMMARY    DATE OF ADMISSION: 8/17/2023    DATE OF DISCHARGE: 8/22/2023    DISCHARGE DIAGNOSIS:  Principal Problem:    Choledocholithiasis  Resolved Problems:    * No resolved hospital problems. *      CONSULTATIONS:  Dr. Bert Fay, Gastroenterology    PROCEDURES:  Laparoscopic cholecystectomy and intraoperative cholangiogram, performed by Dr. Al Carrera, on 8/18/2023    BRIEF HPI and HOSPITAL COURSE:  Patient presented to the emergency room with 1 day history of abdominal pain.  She associated the pain with nausea and vomiting.  Patient also stated that she had some mild shortness of breath, fevers and chills.  Imaging demonstrated cholangitis and possible cholecystitis.  Patient had a white count of 25 on admission.  Patient was made n.p.o., placed on maintenance IV fluids and ceftriaxone and Flagyl.  Dr. Reynoso, GI, was consulted and recommended an MRI and possibly undergoing an ERCP the following morning with a cholecystectomy to follow.  The following morning, the patient was found to be extremely uncomfortable with tender right upper quadrant and diffused pain, wall guarding.  The patient's total bilirubin also slightly increased.  The MRCP demonstrated no choledocholithiasis or common ductal dilation.  Therefore the patient was taken to the operating suite for a laparoscopic cholecystectomy and intraoperative cholangiogram on 8/18/2023.  A Jay Giang drain was left in place.  The output was monitored for drainage color.  Initially the drainage was bilious and slowly progressed to a serous color.  Patient white count improved throughout her hospital stay.  On the day of discharge, the patient was afebrile and nontoxic in appearance.  She was tolerating a regular diet.  She was ambulating and on room air.  She had had a bowel movement.  The ANALI drain was removed.  Her abdomen was soft and nontender.  Surgical sites were well approximated with Dermabond and no signs of infection.   Prescriptions and follow-ups were discussed with the patient.  All questions answered.    DISPOSITION:   Discharged home in stable condition on 8/22/2023. The patient was counseled and questions were answered. Specifically, signs and symptoms of infection, respiratory decompensation and persistent or worsening pain were discussed and the patient agrees to seek medical attention if any of these develop.    DISCHARGE MEDICATIONS:  The patients controlled substance history was reviewed and a controlled substance use informed consent (if applicable) was provided by [facility] and the patient has been prescribed.     Medication List        START taking these medications        Instructions   acetaminophen 500 MG Tabs  Commonly known as: Tylenol   Take 2 Tablets by mouth every 6 hours. Take as directed on the bottle. Take as needed for pain  Dose: 1,000 mg     oxyCODONE immediate-release 5 MG Tabs  Commonly known as: Roxicodone   Take 1 Tablet by mouth every four hours as needed for Severe Pain for up to 2 days.  Dose: 5 mg     senna-docusate 8.6-50 MG Tabs  Commonly known as: Pericolace Or Senokot S   Take 2 Tablets by mouth 2 times a day. Take as directed on the bottle. Take while on narcotics to avoid constipation.  Dose: 2 Tablet            CONTINUE taking these medications        Instructions   FISH OIL PO   Take 1 Capsule by mouth every day.  Dose: 1 Capsule     therapeutic multivitamin-minerals Tabs   Take 1 Tablet by mouth every day.  Dose: 1 Tablet            You will be given a prescription for pain medication at discharge. Please take these as directed. It is important to remember not to take medications on an empty stomach as this may cause nausea.  You may also take over the counter acetaminophen and/or NSAIDS (ibuprofen, Aleve, Advil, Motrin) per the package instructions.  You may also use ice to the wound to decrease pain and swelling. You may alternate 20 minutes on and 20 minutes off  with the ice for the first 24-48 hours. Make sure you place a washcloth or towel between the ice pack and your skin.  Please note that narcotic pain medication cannot be refilled unless you are seen by a doctor. Make sure you call the office if you are running low on medication or if the dose you have been prescribed is not working well for you.    ACTIVITY:  After discharge from the hospital, you may resume full routine activities; however, there should be no heavy lifting (greater than 20 pounds or a bag of groceries) and no strenuous activities for at least 2 weeks. The duration may be longer, depending on your surgical procedure. Routine activities of daily living are acceptable. Activity level should be addressed at your post-op follow up appointment. You may drive whenever you are off pain medications and are able to perform the activities needed to drive, i.e., turning, bending, twisting, etc.      WOUND CARE:  You may shower, but do not submerge in a bath for at least two weeks. If you have wound dressings, they may come off after 48 hours. If you have skin glue to the wound, this will fall off on its own, do not pick at it. If you have steri strips to the wound, these will fall off on their own, do not pick at them, may trim the edges if needed.      DIET:  Upon discharge from the hospital, you may resume your normal preoperative diet, unless specifically directed otherwise. Depending on how you are feeling and whether you have nausea or not, you may wish to stay with a bland diet for the first few days. However, you can advance this as quickly as you feel ready.      FOLLOW UP:  [facility]  [address]  [place] [address]  [phone]    Follow up  Follow up in the [facility] clinic as needed.    [facility]  [address]  [place] [address]  [phone]    Follow up  Follow up in 1-2 weeks to inform them of your hospital admission    Call the office if you have: (1) Fevers  to more than 101F, (2) Unusual chest or leg pain, (3) Drainage or fluid from incision that may be foul smelling, increased tenderness or soreness at the wound or the wound edges are no longer together, redness or swelling at the incision site. Do not hesitate to call with any other questions.    TIME SPENT ON DISCHARGE: 35 minutes      ____________________________________________  Mai Barraza D.N.P.    DD: 8/22/2023 11:58 AM

## 2024-03-06 ENCOUNTER — APPOINTMENT (OUTPATIENT)
Dept: URGENT CARE | Facility: CLINIC | Age: 29
End: 2024-03-06
Payer: COMMERCIAL

## (undated) DEVICE — CLIP MED LG INTNL HRZN TI ESCP - (20/BX)

## (undated) DEVICE — TROCAR Z THREAD11MM OPTICAL - NON BLADED(6/BX)

## (undated) DEVICE — SET LEADWIRE 5 LEAD BEDSIDE DISPOSABLE ECG (1SET OF 5/EA)

## (undated) DEVICE — BLOCK BITE MAXI DENTAL RETENTION RIM (100EA/BX)

## (undated) DEVICE — SODIUM CHL IRRIGATION 0.9% 1000ML (12EA/CA)

## (undated) DEVICE — TUBING CLEARLINK DUO-VENT - C-FLO (48EA/CA)

## (undated) DEVICE — GOWN WARMING STANDARD FLEX - (30/CA)

## (undated) DEVICE — BANDAID SHEER STRIP 3/4 IN (100EA/BX 12BX/CA)

## (undated) DEVICE — ELECTRODE DUAL RETURN W/ CORD - (50/PK)

## (undated) DEVICE — PACK LAP CHOLE OR - (2EA/CA)

## (undated) DEVICE — SENSOR OXIMETER ADULT SPO2 RD SET (20EA/BX)

## (undated) DEVICE — SET EXTENSION WITH 2 PORTS (48EA/CA) ***PART #2C8610 IS A SUBSTITUTE*****

## (undated) DEVICE — CANISTER SUCTION 3000ML MECHANICAL FILTER AUTO SHUTOFF MEDI-VAC NONSTERILE LF DISP  (40EA/CA)

## (undated) DEVICE — COVER LIGHT HANDLE ALC PLUS DISP (18EA/BX)

## (undated) DEVICE — COVER ENDOSCOPE DISTAL SINGLE USE (20EA/BX)

## (undated) DEVICE — CANISTER SUCTION RIGID RED 1500CC (40EA/CA)

## (undated) DEVICE — TOWEL STOP TIMEOUT SAFETY FLAG (40EA/CA)

## (undated) DEVICE — CONNECTOR HUBLESS DRAINAGE - ONE WAY (20/BX)

## (undated) DEVICE — GLOVE BIOGEL INDICATOR SZ 7.5 SURGICAL PF LTX - (50PR/BX 4BX/CA)

## (undated) DEVICE — CATHETER REDDICK ----MUST ORDER IN MULITPLES OF 10----

## (undated) DEVICE — SUTURE 0 COATED VICRYL 6-18IN - (12PK/BX)

## (undated) DEVICE — CANNULA W/SEAL 5X100 Z-THRE - ADED KII (12/BX)

## (undated) DEVICE — LACTATED RINGERS INJ 1000 ML - (14EA/CA 60CA/PF)

## (undated) DEVICE — KIT CUSTOM PROCEDURE SINGLE FOR ENDO  (15/CA)

## (undated) DEVICE — TUBE CONNECTING SUCTION - CLEAR PLASTIC STERILE 72 IN (50EA/CA)

## (undated) DEVICE — SET TUBING PNEUMOCLEAR HIGH FLOW SMOKE EVACUATION (10EA/BX)

## (undated) DEVICE — GLOVE BIOGEL SZ 7.5 SURGICAL PF LTX - (50PR/BX 4BX/CA)

## (undated) DEVICE — SUTURE 0 VICRYL PLUS UR-6 - 27 INCH (36/BX)

## (undated) DEVICE — WATER IRRIGATION STERILE 1000ML (12EA/CA)

## (undated) DEVICE — CANNULA O2 COMFORT SOFT EAR ADULT 7 FT TUBING (50/CA)

## (undated) DEVICE — SUCTION INSTRUMENT YANKAUER BULBOUS TIP W/O VENT (50EA/CA)

## (undated) DEVICE — MASK OXYGEN VNYL ADLT MED CONC WITH 7 FOOT TUBING  - (50EA/CA)

## (undated) DEVICE — SUTURE 4-0 VICRYL PLUS FS-2 - 27 INCH (36/BX)

## (undated) DEVICE — KIT  I.V. START (100EA/CA)

## (undated) DEVICE — BANDAID X-LARGE 2 X 4 IN LF (50EA/BX)

## (undated) DEVICE — SLEEVE VASO CALF MED - (10PR/CA)

## (undated) DEVICE — CONTAINER, SPECIMEN, STERILE

## (undated) DEVICE — TROCAR 5X100 BLADED Z-THREAD - KII (6/BX)

## (undated) DEVICE — SUTURE GENERAL

## (undated) DEVICE — CHLORAPREP 26 ML APPLICATOR - ORANGE TINT(25/CA)

## (undated) DEVICE — DERMABOND ADVANCED - (12EA/BX)

## (undated) DEVICE — SET SUCTION/IRRIGATION WITH DISPOSABLE TIP (6/CA )PART #0250-070-520 IS A SUB